# Patient Record
Sex: FEMALE | Race: WHITE | NOT HISPANIC OR LATINO | Employment: UNEMPLOYED | URBAN - METROPOLITAN AREA
[De-identification: names, ages, dates, MRNs, and addresses within clinical notes are randomized per-mention and may not be internally consistent; named-entity substitution may affect disease eponyms.]

---

## 2022-03-18 LAB
EXTERNAL ABO GROUPING: NORMAL
EXTERNAL ANTIBODY SCREEN: NORMAL
EXTERNAL RH FACTOR: POSITIVE

## 2022-05-12 LAB
EXTERNAL HIV-1/2 AB-AG: NORMAL
GLUCOSE 1H P 50 G GLC PO SERPL-MCNC: 131 MG/DL (ref 70–183)

## 2022-05-18 LAB
EXTERNAL CHLAMYDIA SCREEN: NEGATIVE
EXTERNAL GONORRHEA SCREEN: NEGATIVE
EXTERNAL HEPATITIS B SURFACE ANTIGEN: NEGATIVE

## 2022-05-24 ENCOUNTER — TELEPHONE (OUTPATIENT)
Dept: PERINATAL CARE | Facility: CLINIC | Age: 22
End: 2022-05-24

## 2022-05-24 NOTE — TELEPHONE ENCOUNTER
Mariuszm with [facility] regarding receiving referral to schedule patient for her detailed ultrasound  Her rick is 11/23    20wks 7/6   I don't have a telephone number to call her  As soon as I receive a call back, I will call patient

## 2022-07-13 NOTE — PATIENT INSTRUCTIONS
Thank you for choosing us for your  care today  If you have any questions about your ultrasound or care, please do not hesitate to contact us or your primary obstetrician  Some general instructions for your pregnancy are:    Protect against coronavirus: get vaccinated - pregnant women are increased risk of severe COVID  Notify your primary care doctor if you have any symptoms  Exercise: Aim for 22 minutes per day (150 minutes per week) of regular exercise  Walking is great! Nutrition: aim for calcium-rich and iron-rich foods as well as healthy sources of protein  Learn about Preeclampsia: preeclampsia is a common, serious high blood pressure complication in pregnancy  A blood pressure of 572TYHG (systolic or top number) or 77CPXE (diastolic or bottom number) is not normal and needs evaluation by your doctor  Aspirin is sometimes prescribed in early pregnancy to prevent preeclampsia in women with risk factors - ask your obstetrician if you should be on this medication  If you smoke, try to reduce how many cigarettes you smoke or try to quit completely  Do not vape  Other warning signs to watch out for in pregnancy or postpartum: chest pain, obstructed breathing or shortness of breath, seizures, thoughts of hurting yourself or your baby, bleeding, a painful or swollen leg, fever, or headache (see [facility] POST-BIRTH Warning Signs campaign)  If these happen call 911  Itching is also not normal in pregnancy and if you experience this, especially over your hands and feet, potentially worse at night, notify your doctors  Low dose aspirin, when started early in pregnancy, can reduce your risk of (prevent) preeclampsia  General dose recommendation is 162mg (two of the 81mg tablets) daily  Side effects are generally none to mild, however, if you experience what you think may be an allergic reaction after starting aspirin, immediately stop it and notify your doctor    If you have asthma or acid reflux and notice an increase in symptom frequency or severity, consider stopping this medication and notify your doctor  If you have had bariatric surgery, you may need a different dose of medication with or without acid-reducing medication  We advise routine discontinuation of this medication at 36 weeks  This QR code below links to additional information from ACOG on preeclampsia  There is also more information at [employer] com cy (Preeclampsia [employer])

## 2022-07-14 ENCOUNTER — ROUTINE PRENATAL (OUTPATIENT)
Dept: PERINATAL CARE | Facility: OTHER | Age: 22
End: 2022-07-14
Payer: COMMERCIAL

## 2022-07-14 VITALS
WEIGHT: 266.8 LBS | BODY MASS INDEX: 45.55 KG/M2 | DIASTOLIC BLOOD PRESSURE: 83 MMHG | SYSTOLIC BLOOD PRESSURE: 133 MMHG | HEART RATE: 91 BPM | HEIGHT: 64 IN

## 2022-07-14 DIAGNOSIS — Z36.3 ENCOUNTER FOR ANTENATAL SCREENING FOR MALFORMATIONS: ICD-10-CM

## 2022-07-14 DIAGNOSIS — Z36.86 ENCOUNTER FOR ANTENATAL SCREENING FOR CERVICAL LENGTH: ICD-10-CM

## 2022-07-14 DIAGNOSIS — Z87.59 HISTORY OF GESTATIONAL HYPERTENSION: ICD-10-CM

## 2022-07-14 DIAGNOSIS — O99.210 OBESITY IN PREGNANCY, ANTEPARTUM: ICD-10-CM

## 2022-07-14 DIAGNOSIS — Z3A.21 21 WEEKS GESTATION OF PREGNANCY: ICD-10-CM

## 2022-07-14 DIAGNOSIS — O09.899 SUPERVISION OF OTHER HIGH RISK PREGNANCIES, UNSPECIFIED TRIMESTER: ICD-10-CM

## 2022-07-14 DIAGNOSIS — O34.219 HISTORY OF CESAREAN DELIVERY, ANTEPARTUM: Primary | ICD-10-CM

## 2022-07-14 PROCEDURE — 76817 TRANSVAGINAL US OBSTETRIC: CPT | Performed by: OBSTETRICS & GYNECOLOGY

## 2022-07-14 PROCEDURE — 99242 OFF/OP CONSLTJ NEW/EST SF 20: CPT | Performed by: OBSTETRICS & GYNECOLOGY

## 2022-07-14 PROCEDURE — 76811 OB US DETAILED SNGL FETUS: CPT | Performed by: OBSTETRICS & GYNECOLOGY

## 2022-07-14 RX ORDER — ASPIRIN 81 MG/1
TABLET ORAL
COMMUNITY

## 2022-07-14 NOTE — PROGRESS NOTES
[address]: Ms Melina Enriquez was seen today for anatomic survey and cervical length screening ultrasound  See ultrasound report under "OB Procedures" tab  Review of Systems   Constitutional: Negative for chills, fever and unexpected weight change  HENT: Negative for congestion, dental problem, facial swelling and sore throat  Eyes: Negative for visual disturbance  Respiratory: Negative for cough and shortness of breath  Cardiovascular: Negative for chest pain and palpitations  Gastrointestinal: Negative for diarrhea and vomiting  Endocrine: Negative for polydipsia  Genitourinary: Negative for dysuria and vaginal bleeding  Musculoskeletal: Negative for back pain and joint swelling  Skin: Negative for rash and wound  Allergic/Immunologic: Negative for immunocompromised state  Neurological: Negative for seizures and headaches  Hematological: Does not bruise/bleed easily  Psychiatric/Behavioral: Negative for hallucinations and suicidal ideas  Physical Exam  Constitutional:       General: She is not in acute distress  Appearance: Normal appearance  She is not ill-appearing, toxic-appearing or diaphoretic  HENT:      Head: Normocephalic and atraumatic  Nose: No congestion or rhinorrhea  Eyes:      General: No scleral icterus  Right eye: No discharge  Left eye: No discharge  Extraocular Movements: Extraocular movements intact  Conjunctiva/sclera: Conjunctivae normal    Pulmonary:      Effort: Pulmonary effort is normal  No respiratory distress  Musculoskeletal:      Cervical back: Normal range of motion  Skin:     Coloration: Skin is not jaundiced or pale  Findings: No erythema, lesion or rash  Neurological:      General: No focal deficit present  Mental Status: She is alert and oriented to person, place, and time     Psychiatric:         Mood and Affect: Mood normal          Behavior: Behavior normal  Please don't hesitate to contact our office with any concerns or questions    Jada Rai MD

## 2022-07-14 NOTE — PROGRESS NOTES
Ultrasound Probe Disinfection    A transvaginal ultrasound was performed  Prior to use, disinfection was performed with High Level Disinfection Process (Trophon)  Probe serial number [id number]: [id number] was used        Irwin [place]  07/14/22  8:11 AM

## 2022-07-14 NOTE — LETTER
2022    [address]  [address]    Patient: Austyn Ca   YOB: 2000   Date of Visit: 2022   Gestational age 18w1d   Coco Dysonrid of this communication: Routine       Dear Damon Sanchez,    This patient was seen recently in our  office  Consultation is contained in body of ultrasound report which has been faxed to you under separate cover; please contact us if you do not receive this  Please don't hesitate to contact our office with any concerns or questions       Sincerely,      Nelida Quinn MD  Attending Physician, [place]

## 2022-08-12 LAB — EXTERNAL SYPHILIS RPR SCREEN: NORMAL

## 2022-08-29 ENCOUNTER — ULTRASOUND (OUTPATIENT)
Dept: PERINATAL CARE | Facility: OTHER | Age: 22
End: 2022-08-29
Payer: COMMERCIAL

## 2022-08-29 VITALS
SYSTOLIC BLOOD PRESSURE: 121 MMHG | HEIGHT: 64 IN | DIASTOLIC BLOOD PRESSURE: 79 MMHG | WEIGHT: 270.6 LBS | BODY MASS INDEX: 46.2 KG/M2 | HEART RATE: 93 BPM

## 2022-08-29 DIAGNOSIS — O99.210 OBESITY AFFECTING PREGNANCY, ANTEPARTUM: Primary | ICD-10-CM

## 2022-08-29 DIAGNOSIS — Z3A.27 27 WEEKS GESTATION OF PREGNANCY: ICD-10-CM

## 2022-08-29 PROBLEM — E66.01 OBESITY, MORBID, BMI 40.0-49.9 (HCC): Status: ACTIVE | Noted: 2020-07-10

## 2022-08-29 PROCEDURE — 76816 OB US FOLLOW-UP PER FETUS: CPT | Performed by: OBSTETRICS & GYNECOLOGY

## 2022-08-29 PROCEDURE — 99213 OFFICE O/P EST LOW 20 MIN: CPT | Performed by: OBSTETRICS & GYNECOLOGY

## 2022-08-29 NOTE — PROGRESS NOTES
The patient was seen today for an ultrasound  Please see ultrasound report (located under Ob Procedures) for additional details  Thank you very much for allowing us to participate in the care of this very nice patient  Should you have any questions, please do not hesitate to contact me  Shreyas Jeffers MD [address] [facility]  Attending Physician, [place]

## 2022-10-12 ENCOUNTER — ULTRASOUND (OUTPATIENT)
Dept: PERINATAL CARE | Facility: OTHER | Age: 22
End: 2022-10-12
Payer: COMMERCIAL

## 2022-10-12 VITALS
HEART RATE: 107 BPM | BODY MASS INDEX: 47.63 KG/M2 | SYSTOLIC BLOOD PRESSURE: 132 MMHG | DIASTOLIC BLOOD PRESSURE: 82 MMHG | HEIGHT: 64 IN | WEIGHT: 279 LBS

## 2022-10-12 DIAGNOSIS — O36.63X0 EXCESSIVE FETAL GROWTH AFFECTING MANAGEMENT OF PREGNANCY IN THIRD TRIMESTER, SINGLE OR UNSPECIFIED FETUS: ICD-10-CM

## 2022-10-12 DIAGNOSIS — Z3A.34 34 WEEKS GESTATION OF PREGNANCY: ICD-10-CM

## 2022-10-12 DIAGNOSIS — O99.210 OBESITY AFFECTING PREGNANCY, ANTEPARTUM: Primary | ICD-10-CM

## 2022-10-12 PROBLEM — O36.60X0 EXCESSIVE FETAL GROWTH AFFECTING MANAGEMENT OF MOTHER: Status: ACTIVE | Noted: 2022-10-12

## 2022-10-12 PROCEDURE — 76816 OB US FOLLOW-UP PER FETUS: CPT | Performed by: OBSTETRICS & GYNECOLOGY

## 2022-10-12 PROCEDURE — 76819 FETAL BIOPHYS PROFIL W/O NST: CPT | Performed by: OBSTETRICS & GYNECOLOGY

## 2022-10-12 PROCEDURE — 99213 OFFICE O/P EST LOW 20 MIN: CPT | Performed by: OBSTETRICS & GYNECOLOGY

## 2022-10-12 NOTE — PROGRESS NOTES
The patient was seen today for an ultrasound  Please see ultrasound report (located under Ob Procedures) for additional details  Thank you very much for allowing us to participate in the care of this very nice patient  Should you have any questions, please do not hesitate to contact me  Shreyas Myers MD [address]  Attending Physician, [place]

## 2022-11-01 ENCOUNTER — HOSPITAL ENCOUNTER (OUTPATIENT)
Facility: HOSPITAL | Age: 22
Discharge: HOME | End: 2022-11-01
Attending: OBSTETRICS & GYNECOLOGY | Admitting: OBSTETRICS & GYNECOLOGY

## 2022-11-01 VITALS
HEIGHT: 64 IN | SYSTOLIC BLOOD PRESSURE: 115 MMHG | TEMPERATURE: 98.3 F | OXYGEN SATURATION: 98 % | HEART RATE: 70 BPM | WEIGHT: 285 LBS | DIASTOLIC BLOOD PRESSURE: 59 MMHG | BODY MASS INDEX: 48.65 KG/M2 | RESPIRATION RATE: 18 BRPM

## 2022-11-01 LAB
ALBUMIN SERPL BCP-MCNC: 2.6 G/DL (ref 3.5–5)
ALP SERPL-CCNC: 112 U/L (ref 46–116)
ALT SERPL W P-5'-P-CCNC: 17 U/L (ref 12–78)
ANION GAP SERPL CALCULATED.3IONS-SCNC: 9 MMOL/L (ref 4–13)
AST SERPL W P-5'-P-CCNC: 16 U/L (ref 5–45)
BILIRUB SERPL-MCNC: 0.97 MG/DL (ref 0.2–1)
BUN SERPL-MCNC: 4 MG/DL (ref 5–25)
CALCIUM ALBUM COR SERPL-MCNC: 10 MG/DL (ref 8.3–10.1)
CALCIUM SERPL-MCNC: 8.9 MG/DL (ref 8.3–10.1)
CHLORIDE SERPL-SCNC: 105 MMOL/L (ref 96–108)
CO2 SERPL-SCNC: 25 MMOL/L (ref 21–32)
CREAT SERPL-MCNC: 0.51 MG/DL (ref 0.6–1.3)
CREAT UR-MCNC: 25.1 MG/DL
ERYTHROCYTE [DISTWIDTH] IN BLOOD BY AUTOMATED COUNT: 14 % (ref 11.6–15.1)
GFR SERPL CREATININE-BSD FRML MDRD: 135 ML/MIN/1.73SQ M
GLUCOSE SERPL-MCNC: 80 MG/DL (ref 65–140)
HCT VFR BLD AUTO: 34.7 % (ref 34.8–46.1)
HGB BLD-MCNC: 11.4 G/DL (ref 11.5–15.4)
MCH RBC QN AUTO: 27.3 PG (ref 26.8–34.3)
MCHC RBC AUTO-ENTMCNC: 32.9 G/DL (ref 31.4–37.4)
MCV RBC AUTO: 83 FL (ref 82–98)
PLATELET # BLD AUTO: 214 THOUSANDS/UL (ref 149–390)
PMV BLD AUTO: 10.7 FL (ref 8.9–12.7)
POTASSIUM SERPL-SCNC: 3.7 MMOL/L (ref 3.5–5.3)
PROT SERPL-MCNC: 7.3 G/DL (ref 6.4–8.4)
PROT UR-MCNC: <6 MG/DL
PROT/CREAT UR: <0.24 MG/G{CREAT} (ref 0–0.1)
RBC # BLD AUTO: 4.18 MILLION/UL (ref 3.81–5.12)
SODIUM SERPL-SCNC: 139 MMOL/L (ref 135–147)
WBC # BLD AUTO: 8.55 THOUSAND/UL (ref 4.31–10.16)

## 2022-11-01 NOTE — PROGRESS NOTES
L&D Triage Note - OB/GYN  Rebecca Rivera 23 y o  female MRN: [id number]  Unit/Bed#: L&D 327-01 Encounter: [id number]      ASSESSMENT:    Rebecca Rivera is a 23 y o   at 36w6d  who presents today due to concern for preeclampsia  Due to her lack of elevated pressures more than 4 hours apart or lab abnormalities, at this point she does not qualify for the diagnosis of gestational hypertension or preeclampsia  It is therefore safe to discharge her home at this time  PLAN:    1) Blood pressure monitoring for  2) Collect PreE Labs  • CMC  • CMP  • UA  • P:C ratio  3) Collect GBS swab   Continue routine prenatal care  4) Discharge from [facility] triage with  labor precautions    - Reviewed rupture of membranes, false vs true labor, decreased fetal movement, and vaginal bleeding   - Pt to call provider with any concerns and follow up at her next scheduled prenatal appointment    - Case discussed with Dr Zepeda    SUBJECTIVE:    Michelle Kirkland 23 y o   at 36w6d with an Estimated Date of Delivery: 22 who was sent over today from the office following a blood pressure of 150/90  The patient is tearful and explains that “this happened last time”  Her prior pregnancy ended in a primary  section in 2020 following a failed induction for gestational hypertension  This pregnancy has also been complicated by polyhydramnios (JANICE 31) and excessive fetal growth  She denies contractions, leakage of fluid, vaginal bleeding  She says that the she feels good fetal movement       OBJECTIVE:    Vitals:    22 1200   BP: 115/59   Pulse:    Resp: 18   Temp:    SpO2:      Labs:   Platelets: 146  AST/ALT: 16/17  P:C: <0 24   ROS:  Constitutional: Negative  Neuro:  Denies headache, vision changes  Respiratory: Negative, denies shortness of breath  Cardiovascular:  Denies retrosternal pain  Gastrointestinal:  Denies epigastric pain, denies upper abdominal pain,    General Physical Exam:  General: in no apparent distress, non-toxic and alert  Cardiovascular: Cor RRR  Lungs: non-labored breathing  Abdomen: abdomen is soft without significant tenderness, masses, organomegaly or guarding  Lower extremeties: nontender    Cervical Exam  SVE: patient declined    Fetal monitoring:  FHT:  135 bpm/ Moderate 6 - 25 bpm / 15 x 15 accelerations present, no decelerations  [place]: contractions not noted, uterine irritability present            SAMEERA Calix PGY-1  11/1/2022 12:35 PM

## 2022-11-01 NOTE — DISCHARGE INSTRUCTIONS
Pregnancy at [address] to [address]:   What changes are happening with my body? You are considered full term at the beginning of 37 weeks  Your breathing may be easier if your baby has moved down into a head-down position  You may need to urinate more often because the baby may be pressing on your bladder  You may also feel more discomfort and get tired easily  How do I care for myself at this stage of my pregnancy? Eat a variety of healthy foods  Healthy foods include fruits, vegetables, whole-grain breads, low-fat dairy foods, beans, lean meats, and fish  Drink liquids as directed  Ask how much liquid to drink each day and which liquids are best for you  Limit caffeine to less than 200 milligrams each day  Limit your intake of fish to 2 servings each week  Choose fish low in mercury such as canned light tuna, shrimp, salmon, cod, or tilapia  Do not  eat fish high in mercury such as swordfish, tilefish, amado mackerel, and shark  Take prenatal vitamins as directed  Your need for certain vitamins and minerals, such as folic acid, increases during pregnancy  Prenatal vitamins provide some of the extra vitamins and minerals you need  Prenatal vitamins may also help to decrease the risk of certain birth defects  Rest as needed  Put your feet up if you have swelling in your ankles and feet  Talk to your healthcare provider about exercise  Moderate exercise can help you stay fit  Your healthcare provider will help you plan an exercise program that is safe for you during pregnancy  Do not smoke  Smoking increases your risk of a miscarriage and other health problems during your pregnancy  Smoking can cause your baby to be born early or weigh less at birth  Ask your healthcare provider for information if you need help quitting  Do not drink alcohol  Alcohol passes from your body to your baby through the placenta   It can affect your baby's brain development and cause fetal alcohol syndrome (FAS)  FAS is a group of conditions that causes mental, behavior, and growth problems  Talk to your healthcare provider before you take any medicines  Many medicines may harm your baby if you take them when you are pregnant  Do not take any medicines, vitamins, herbs, or supplements without first talking to your healthcare provider  Never use illegal or street drugs (such as marijuana or cocaine) while you are pregnant  What are some safety tips during pregnancy? Avoid hot tubs and saunas  Do not use a hot tub or sauna while you are pregnant, especially during your first trimester  Hot tubs and saunas may raise your baby's temperature and increase the risk of birth defects  Avoid toxoplasmosis  This is an infection caused by eating raw meat or being around infected cat feces  It can cause birth defects, miscarriages, and other problems  Wash your hands after you touch raw meat  Make sure any meat is well-cooked before you eat it  Avoid raw eggs and unpasteurized milk  Use gloves or ask someone else to clean your cat's litter box while you are pregnant  Ask your healthcare provider about travel  The most comfortable time to travel is during the second trimester  Ask your provider if you can travel after 36 weeks  You may not be able to travel in an airplane after 36 weeks  He or she may also recommend you avoid long road trips  What changes are happening with my baby? By 38 weeks, your baby may weigh between 6 and 9 pounds  Your baby may be about 14 inches long from the top of the head to the rump (baby's bottom)  Your baby hears well enough to know your voice  As your baby gets larger, you may feel fewer kicks and more stretching and rolling  Your baby may move into a head-down position  Your baby will also rest lower in your abdomen  What do I need to know about prenatal care? Your healthcare provider will check your blood pressure and weight   You may also need the following:  A urine test  may also be done to check for sugar and protein  These can be signs of gestational diabetes or infection  Protein in your urine may also be a sign of preeclampsia  Preeclampsia is a condition that can develop during week 20 or later of your pregnancy  It causes high blood pressure, and it can cause problems with your kidneys and other organs  A gestational diabetes screen  may be done  Your healthcare provider may order either a 1-step or 2-step oral glucose tolerance test (OGTT)  1-step OGTT:  Your blood sugar level will be tested after you have not eaten for 8 hours (fasting)  You will then be given a glucose drink  Your level will be tested again 1 hour and 2 hours after you finish the drink  2-step OGTT:  You do not have to fast for the first part of the test  You will have the glucose drink at any time of day  Your blood sugar level will be checked 1 hour later  If your blood sugar is higher than a certain level, another test will be ordered  You will fast and your blood sugar level will be tested  You will have the glucose drink  Your blood will be tested again 1 hour, 2 hours, and 3 hours after you finish the glucose drink  A blood test  may be done to check for anemia (low iron level)  A Tdap vaccine  may be recommended by your healthcare provider  A group B strep test  is a test that is done to check for group B strep infection  Group B strep is a type of bacteria that may be found in the vagina or rectum  It can be passed to your baby during delivery if you have it  Your healthcare provider will take swab your vagina or rectum and send the sample to the lab for tests  Fundal height  is a measurement of your uterus to check your baby's growth  This number is usually the same as the number of weeks that you have been pregnant  Your healthcare provider may also check your baby's position  Your baby's heart rate  will be checked      When should I seek immediate care? You develop a severe headache that does not go away  You have new or increased vision changes, such as blurred or spotted vision  You have new or increased swelling in your face or hands  You have vaginal spotting or bleeding  Your water broke or you feel warm water gushing or trickling from your vagina  When should I call my obstetrician? You have more than 5 contractions in 1 hour  You notice any changes in your baby's movements  You have abdominal cramps, pressure, or tightening  You have a change in vaginal discharge  You have chills or a fever  You have vaginal itching, burning, or pain  You have yellow, green, white, or foul-smelling vaginal discharge  You have pain or burning when you urinate, less urine than usual, or pink or bloody urine  You have questions or concerns about your condition or care  CARE AGREEMENT:   You have the right to help plan your care  Learn about your health condition and how it may be treated  Discuss treatment options with your healthcare providers to decide what care you want to receive  You always have the right to refuse treatment  The above information is an  only  It is not intended as medical advice for individual conditions or treatments  Talk to your doctor, nurse or pharmacist before following any medical regimen to see if it is safe and effective for you  © Copyright [facility] 2022 Information is for End User's use only and may not be sold, redistributed or otherwise used for commercial purposes   All illustrations and images included in CareNotes® are the copyrighted property of A D A M , Inc  or [address] [place] [address]

## 2022-11-03 LAB — GP B STREP DNA SPEC QL NAA+PROBE: NEGATIVE

## 2022-11-18 ENCOUNTER — HOSPITAL ENCOUNTER (INPATIENT)
Facility: HOSPITAL | Age: 22
LOS: 4 days | Discharge: HOME | End: 2022-11-22
Attending: OBSTETRICS & GYNECOLOGY | Admitting: OBSTETRICS & GYNECOLOGY

## 2022-11-18 ENCOUNTER — HOSPITAL ENCOUNTER (OUTPATIENT)
Dept: LABOR AND DELIVERY | Facility: HOSPITAL | Age: 22
Discharge: HOME | End: 2022-11-18

## 2022-11-18 DIAGNOSIS — Z98.891 S/P REPEAT LOW TRANSVERSE C-SECTION: ICD-10-CM

## 2022-11-18 DIAGNOSIS — Z3A.39 39 WEEKS GESTATION OF PREGNANCY: Primary | ICD-10-CM

## 2022-11-18 PROBLEM — O13.9 GESTATIONAL HYPERTENSION: Status: ACTIVE | Noted: 2022-11-18

## 2022-11-18 LAB
ABO GROUP BLD: NORMAL
ALBUMIN SERPL BCP-MCNC: 3.1 G/DL (ref 3.5–5)
ALP SERPL-CCNC: 162 U/L (ref 46–116)
ALT SERPL W P-5'-P-CCNC: 17 U/L (ref 12–78)
ANION GAP SERPL CALCULATED.3IONS-SCNC: 12 MMOL/L (ref 4–13)
AST SERPL W P-5'-P-CCNC: 18 U/L (ref 5–45)
BASOPHILS # BLD AUTO: 0.03 THOUSANDS/ÂΜL (ref 0–0.1)
BASOPHILS NFR BLD AUTO: 0 % (ref 0–1)
BILIRUB SERPL-MCNC: 1.2 MG/DL (ref 0.2–1)
BLD GP AB SCN SERPL QL: NEGATIVE
BUN SERPL-MCNC: 7 MG/DL (ref 5–25)
CALCIUM ALBUM COR SERPL-MCNC: 9.7 MG/DL (ref 8.3–10.1)
CALCIUM SERPL-MCNC: 9 MG/DL (ref 8.3–10.1)
CHLORIDE SERPL-SCNC: 103 MMOL/L (ref 96–108)
CO2 SERPL-SCNC: 22 MMOL/L (ref 21–32)
CREAT SERPL-MCNC: 0.58 MG/DL (ref 0.6–1.3)
CREAT UR-MCNC: 38.8 MG/DL
EOSINOPHIL # BLD AUTO: 0.14 THOUSAND/ÂΜL (ref 0–0.61)
EOSINOPHIL NFR BLD AUTO: 1 % (ref 0–6)
ERYTHROCYTE [DISTWIDTH] IN BLOOD BY AUTOMATED COUNT: 14 % (ref 11.6–15.1)
GFR SERPL CREATININE-BSD FRML MDRD: 129 ML/MIN/1.73SQ M
GLUCOSE SERPL-MCNC: 79 MG/DL (ref 65–140)
HCT VFR BLD AUTO: 37.7 % (ref 34.8–46.1)
HGB BLD-MCNC: 12.8 G/DL (ref 11.5–15.4)
IMM GRANULOCYTES # BLD AUTO: 0.07 THOUSAND/UL (ref 0–0.2)
IMM GRANULOCYTES NFR BLD AUTO: 1 % (ref 0–2)
LYMPHOCYTES # BLD AUTO: 2.39 THOUSANDS/ÂΜL (ref 0.6–4.47)
LYMPHOCYTES NFR BLD AUTO: 19 % (ref 14–44)
MCH RBC QN AUTO: 27.7 PG (ref 26.8–34.3)
MCHC RBC AUTO-ENTMCNC: 34 G/DL (ref 31.4–37.4)
MCV RBC AUTO: 82 FL (ref 82–98)
MONOCYTES # BLD AUTO: 0.79 THOUSAND/ÂΜL (ref 0.17–1.22)
MONOCYTES NFR BLD AUTO: 6 % (ref 4–12)
NEUTROPHILS # BLD AUTO: 9.1 THOUSANDS/ÂΜL (ref 1.85–7.62)
NEUTS SEG NFR BLD AUTO: 73 % (ref 43–75)
NRBC BLD AUTO-RTO: 0 /100 WBCS
PLATELET # BLD AUTO: 216 THOUSANDS/UL (ref 149–390)
PMV BLD AUTO: 10.8 FL (ref 8.9–12.7)
POTASSIUM SERPL-SCNC: 3.5 MMOL/L (ref 3.5–5.3)
PROT SERPL-MCNC: 7.8 G/DL (ref 6.4–8.4)
PROT UR-MCNC: 9 MG/DL
PROT/CREAT UR: 0.23 MG/G{CREAT} (ref 0–0.1)
RBC # BLD AUTO: 4.62 MILLION/UL (ref 3.81–5.12)
RH BLD: POSITIVE
SODIUM SERPL-SCNC: 137 MMOL/L (ref 135–147)
SPECIMEN EXPIRATION DATE: NORMAL
WBC # BLD AUTO: 12.52 THOUSAND/UL (ref 4.31–10.16)

## 2022-11-18 PROCEDURE — 4A1HXCZ MONITORING OF PRODUCTS OF CONCEPTION, CARDIAC RATE, EXTERNAL APPROACH: ICD-10-PCS | Performed by: OBSTETRICS & GYNECOLOGY

## 2022-11-18 RX ORDER — SODIUM CHLORIDE, SODIUM LACTATE, POTASSIUM CHLORIDE, CALCIUM CHLORIDE 600; 310; 30; 20 MG/100ML; MG/100ML; MG/100ML; MG/100ML
125 INJECTION, SOLUTION INTRAVENOUS CONTINUOUS
Status: DISCONTINUED | OUTPATIENT
Start: 2022-11-18 | End: 2022-11-20

## 2022-11-18 RX ORDER — ONDANSETRON 2 MG/ML
4 INJECTION INTRAMUSCULAR; INTRAVENOUS EVERY 6 HOURS PRN
Status: DISCONTINUED | OUTPATIENT
Start: 2022-11-18 | End: 2022-11-20

## 2022-11-19 ENCOUNTER — ANESTHESIA EVENT (INPATIENT)
Dept: LABOR AND DELIVERY | Facility: HOSPITAL | Age: 22
End: 2022-11-19

## 2022-11-19 ENCOUNTER — ANESTHESIA (INPATIENT)
Dept: LABOR AND DELIVERY | Facility: HOSPITAL | Age: 22
End: 2022-11-19

## 2022-11-19 PROCEDURE — 10H073Z INSERTION OF MONITORING ELECTRODE INTO PRODUCTS OF CONCEPTION, VIA NATURAL OR ARTIFICIAL OPENING: ICD-10-PCS | Performed by: OBSTETRICS & GYNECOLOGY

## 2022-11-19 PROCEDURE — 3E033VJ INTRODUCTION OF OTHER HORMONE INTO PERIPHERAL VEIN, PERCUTANEOUS APPROACH: ICD-10-PCS | Performed by: OBSTETRICS & GYNECOLOGY

## 2022-11-19 RX ORDER — OXYTOCIN/RINGER'S LACTATE 30/500 ML
1-30 PLASTIC BAG, INJECTION (ML) INTRAVENOUS
Status: DISCONTINUED | OUTPATIENT
Start: 2022-11-19 | End: 2022-11-20

## 2022-11-19 RX ORDER — LIDOCAINE HYDROCHLORIDE AND EPINEPHRINE 15; 5 MG/ML; UG/ML
INJECTION, SOLUTION EPIDURAL AS NEEDED
Status: DISCONTINUED | OUTPATIENT
Start: 2022-11-19 | End: 2022-11-20

## 2022-11-19 RX ORDER — ROPIVACAINE HYDROCHLORIDE 5 MG/ML
INJECTION, SOLUTION EPIDURAL; INFILTRATION; PERINEURAL AS NEEDED
Status: DISCONTINUED | OUTPATIENT
Start: 2022-11-19 | End: 2022-11-20

## 2022-11-19 RX ORDER — CALCIUM CARBONATE 200(500)MG
1000 TABLET,CHEWABLE ORAL 3 TIMES DAILY PRN
Status: DISCONTINUED | OUTPATIENT
Start: 2022-11-19 | End: 2022-11-20

## 2022-11-19 RX ORDER — ROPIVACAINE HYDROCHLORIDE 2 MG/ML
INJECTION, SOLUTION EPIDURAL; INFILTRATION; PERINEURAL CONTINUOUS PRN
Status: DISCONTINUED | OUTPATIENT
Start: 2022-11-19 | End: 2022-11-20

## 2022-11-19 RX ADMIN — SODIUM CHLORIDE, POTASSIUM CHLORIDE, SODIUM LACTATE AND CALCIUM CHLORIDE 125 ML/HR: 600; 310; 30; 20 INJECTION, SOLUTION INTRAVENOUS at 23:10

## 2022-11-19 RX ADMIN — ROPIVACAINE HYDROCHLORIDE 5 MG: 5 INJECTION EPIDURAL; INFILTRATION; PERINEURAL at 19:39

## 2022-11-19 RX ADMIN — ONDANSETRON 4 MG: 2 INJECTION INTRAMUSCULAR; INTRAVENOUS at 22:08

## 2022-11-19 RX ADMIN — Medication 2 MILLI-UNITS/MIN: at 04:30

## 2022-11-19 RX ADMIN — ROPIVACAINE HYDROCHLORIDE: 2 INJECTION, SOLUTION EPIDURAL; INFILTRATION at 20:00

## 2022-11-19 RX ADMIN — SODIUM CHLORIDE, POTASSIUM CHLORIDE, SODIUM LACTATE AND CALCIUM CHLORIDE 999 ML/HR: 600; 310; 30; 20 INJECTION, SOLUTION INTRAVENOUS at 18:56

## 2022-11-19 RX ADMIN — ROPIVACAINE HYDROCHLORIDE 5 ML/HR: 2 INJECTION EPIDURAL; INFILTRATION; PERINEURAL at 19:40

## 2022-11-19 RX ADMIN — LIDOCAINE HYDROCHLORIDE AND EPINEPHRINE 3 ML: 15; 5 INJECTION, SOLUTION EPIDURAL at 19:37

## 2022-11-19 RX ADMIN — SODIUM CHLORIDE, POTASSIUM CHLORIDE, SODIUM LACTATE AND CALCIUM CHLORIDE 125 ML/HR: 600; 310; 30; 20 INJECTION, SOLUTION INTRAVENOUS at 04:30

## 2022-11-19 RX ADMIN — SODIUM CHLORIDE, POTASSIUM CHLORIDE, SODIUM LACTATE AND CALCIUM CHLORIDE 125 ML/HR: 600; 310; 30; 20 INJECTION, SOLUTION INTRAVENOUS at 12:31

## 2022-11-19 NOTE — OB LABOR/OXYTOCIN SAFETY PROGRESS
Oxytocin Safety Progress Check Note - Michelle Kirkland 23 y o  female MRN: [id number]    Unit/Bed#: L&D 325-01 Encounter: [id number]    Dose (jeronimo-units/min) Oxytocin: 16 jeronimo-units/min  Contraction Frequency (minutes): 1-3  Contraction Quality: Mild  Tachysystole: No   Cervical Dilation: 5        Cervical Effacement: 50  Fetal Station: Ballotable  Baseline Rate: 145 bpm  Fetal Heart Rate: 142 BPM  FHR Category: Category I           Vital Signs:   Vitals:    11/19/22 1042   BP: 123/60   Pulse: 80   Resp:    Temp:        Notes/comments:   Pt feeling ctx but not uncomfortable  Making cervical change now  Continue pitocin augmentation          Kylha Hickman DO 11/19/2022 11:03 AM

## 2022-11-19 NOTE — OB LABOR/OXYTOCIN SAFETY PROGRESS
Oxytocin Safety Progress Check Note - Michelle Kirkland 23 y o  female MRN: [id number]    Unit/Bed#: L&D 325-01 Encounter: [id number]    Dose (jeronimo-units/min) Oxytocin: 28 jeronimo-units/min  Contraction Frequency (minutes): 2-5  Contraction Quality: Mild  Tachysystole: No   Cervical Dilation: 6        Cervical Effacement: 70  Fetal Station: -3  Baseline Rate: 135 bpm  Fetal Heart Rate: 142 BPM  FHR Category: Category I               Vital Signs:   Vitals:    11/19/22 1636   BP: 135/72   Pulse: 103   Resp:    Temp:        Notes/comments: AROM for clear copious fluid  FSE placed  Pt tolerated procedure well  Cat 1 FHTs  Continue pitocin augmentation          Kylah Hickman DO 11/19/2022 5:36 PM

## 2022-11-19 NOTE — OB LABOR/OXYTOCIN SAFETY PROGRESS
Oxytocin Safety Progress Check Note - Michelle Kirkland 23 y o  female MRN: [id number]    Unit/Bed#: L&D 325-01 Encounter: [id number]    Dose (jeronimo-units/min) Oxytocin: 28 jeronimo-units/min  Contraction Frequency (minutes): 2-5  Contraction Quality: Mild  Tachysystole: No   Cervical Dilation: 5        Cervical Effacement: 60  Fetal Station: Ballotable  Baseline Rate: 135 bpm  Fetal Heart Rate: 142 BPM  FHR Category: Category I               Vital Signs:   Vitals:    11/19/22 1636   BP: 135/72   Pulse: 103   Resp:    Temp:        Notes/comments: Late entry from 1515  No change since last check  Recommend AROM- pt agreeable but wishes to wait until her mother arrives          Kylah Hickman DO 11/19/2022 5:35 PM

## 2022-11-19 NOTE — OB LABOR/OXYTOCIN SAFETY PROGRESS
Oxytocin Safety Progress Check Note - Michelle Kirkland 23 y o  female MRN: [id number]    Unit/Bed#: L&D 325-01 Encounter: [id number]    Dose (jeronimo-units/min) Oxytocin: 6 jeronimo-units/min  Contraction Frequency (minutes): irregular  Contraction Quality: Mild  Tachysystole: No   Cervical Dilation: 3-4        Cervical Effacement: 50  Fetal Station: -3  Baseline Rate: 130 bpm  Fetal Heart Rate: 130 BPM  FHR Category: Category I               Vital Signs:   Vitals:    11/19/22 0626   BP: 119/62   Pulse: 81   Resp:    Temp:        Notes/comments:   Pt comfortable  Aiken balloon fell out and cervical exam is as above  FHT Cat 1  Continue to titrate Pitocin          Spring Edwards MD 11/19/2022 6:51 AM

## 2022-11-19 NOTE — OB LABOR/OXYTOCIN SAFETY PROGRESS
Labor Progress Note - Michelle Kirkland 23 y o  female MRN: [id number]    Unit/Bed#: L&D 325-01 Encounter: [id number]       Contraction Frequency (minutes): irregular, irritable  Contraction Quality: Mild  Tachysystole: No   Cervical Dilation: 1        Cervical Effacement: 30  Fetal Station: Ballotable  Baseline Rate: 125 bpm  Fetal Heart Rate: 130 BPM  FHR Category: Category I               Vital Signs:   Vitals:    11/19/22 0159   BP: 132/69   Pulse: 77   Resp:    Temp:        Notes/comments: FB still in  Category I tracing   Irregular contraction pattern, Will plan to start pitocin        Isaac Godinez MD 11/19/2022 4:14 AM

## 2022-11-19 NOTE — H&P
H & P- Obstetrics   Michelle Kirkland 23 y o  female MRN: [id number]  Unit/Bed#: L&D 325-01 Encounter: [id number]    Assessment: 23 y o   at 39w2d admitted for induction of labor for gestational hypertension  SVE:   FHT: Category I  GBS status:  Negative     Plan:   · Admit  · CBC, RPR, Type & Screen  · CMP, protein creatinine ratio  · Analgesia at maternal request  · Aiken balloon for cervical ripening with plan for Pitocin titration  · Antibiotics not indicated at this time  ·  cc/hour  · Clear liquid diet    Dr Serena Renee aware      Chief Complaint: I am here for my induction    HPI: Shaun Yeung is a 23 y o   with an ARUN of 2022, by Other Basis at 39w2d who is being admitted for induction of labor for gestational hypertension  She denies having uterine contractions, has no LOF, and reports no VB   She states that the baby moves as usual     Patient Active Problem List   Diagnosis   • Obesity, morbid, BMI 40 0-49 9 ([address] )   • 39 weeks gestation of pregnancy   • Obesity affecting pregnancy, antepartum   • Excessive fetal growth affecting management of mother   • Gestational hypertension   • History of  delivery       Baby complications/comments: suspected macrosomia     Ultrasound 10/12/22    Fetus # 1 of 1  Vertex presentation  Probable macrosomia  Placenta Location = Posterior, fundal  No placenta previa  Placenta Grade = II     MEASUREMENTS (* Included In Average GA)     AC              33 2 cm        37 weeks 1 day * (>97%)  BPD              9 1 cm        36 weeks 6 days* (>97%)  HC              33 6 cm        38 weeks 4 days* (>95%)  Femur            7 3 cm        37 weeks 1 day * (>95%)     Cerebellum       4 4 cm        34 weeks 1 day     HC/AC           1 01 [0 96 - 1 11]                 (35%)  FL/AC             22 [20 - 24]  FL/BPD            80 [71 - 87]  EFW [place] 4   3150 grams - 6 lbs 15 oz                 (>97%)    Review of Systems Constitutional: Negative for chills and fever  Eyes: Negative for visual disturbance  Respiratory: Negative for shortness of breath  Cardiovascular: Negative for chest pain  Gastrointestinal: Negative for abdominal pain, constipation, diarrhea, nausea and vomiting  Genitourinary: Negative for dysuria, hematuria, vaginal bleeding, vaginal discharge and vaginal pain  Neurological: Negative for headaches  OB History    Para Term  AB Living   2 1 1     1   SAB IAB Ectopic Multiple Live Births           1      # Outcome Date GA Lbr Moises/2nd Weight Sex Delivery Anes PTL Lv   2 Current            1 Term 20 38w0d  3375 g (7 lb 7 1 oz) M CS-LTranv EPI  ASHLEY       Past Medical History:   Diagnosis Date   • Gestational hypertension     38 weeks, 2020       Past Surgical History:   Procedure Laterality Date   •  SECTION         Social History     Tobacco Use   • Smoking status: Former     Types: Cigarettes   • Smokeless tobacco: Never   Substance Use Topics   • Alcohol use: Not Currently     Comment: before pregnancy       Allergies   Allergen Reactions   • Alice (Diagnostic) - Food Allergy Other (See Comments)     Itchy throat   • Penicillins Rash     Was young       Medications Prior to Admission   Medication   • Prenatal MV-Min-Fe Fum-FA-DHA (PRENATAL 1 PO)       Objective: Body mass index is 48 06 kg/m²  Vitals:    22 2121   BP: 145/78   Pulse: 85   Resp: 18   Temp: 98 9 °F (37 2 °C)       Physical Exam:  Physical Exam  Constitutional:       General: She is not in acute distress  Appearance: She is well-developed and well-nourished  She is obese  She is not ill-appearing, toxic-appearing or diaphoretic  Genitourinary:      Vulva normal       No vaginal discharge  HENT:      Head: Normocephalic and atraumatic  Eyes:      General: No scleral icterus  Conjunctiva/sclera: Conjunctivae normal    Cardiovascular:      Rate and Rhythm: Normal rate  Pulmonary:      Effort: Pulmonary effort is normal    Abdominal:      Palpations: Abdomen is soft  Tenderness: There is no abdominal tenderness  Neurological:      General: No focal deficit present  Mental Status: She is alert and oriented to person, place, and time  Skin:     General: Skin is warm and dry  Psychiatric:         Mood and Affect: Mood and affect and mood normal          Behavior: Behavior normal    Vitals reviewed  Exam conducted with a chaperone present            SVE: 1/30/-4    FHT:  Baseline: 130 bpm  Variability: Moderate 6-25 bpm  Accelerations: Present 15x15  Decelerations: None    TOCO: irritability with some irregular contractions       Lab Results   Component Value Date    WBC 12 52 (H) 11/18/2022    HGB 12 8 11/18/2022    HCT 37 7 11/18/2022     11/18/2022     Lab Results   Component Value Date    K 3 5 11/18/2022     11/18/2022    CO2 22 11/18/2022    BUN 7 11/18/2022    CREATININE 0 58 (L) 11/18/2022    AST 18 11/18/2022    ALT 17 11/18/2022     Prenatal Labs: Reviewed      Blood type: A Positive  Antibody: Negative  GBS: Negative  HIV: Negative  Rubella: Immune  VDRL/RPR: Non reactive  Varicella: Immune  HBsAg: Negative  Chlamydia: Negative  Gonorrhea: Negative  Diabetes 1 hour screen: 131    >2 Midnights  INPATIENT     Signature/Title: Ganesh Whitten MD  Date: 11/18/2022  Time: 8:36 PM

## 2022-11-19 NOTE — OB LABOR/OXYTOCIN SAFETY PROGRESS
Oxytocin Safety Progress Check Note - Michelle Kirkland 23 y o  female MRN: [id number]    Unit/Bed#: L&D 325-01 Encounter: [id number]    Dose (jeronimo-units/min) Oxytocin: 20 jeronimo-units/min  Contraction Frequency (minutes): 1-4  Contraction Quality: Mild  Tachysystole: No   Cervical Dilation: 5        Cervical Effacement: 50  Fetal Station: Ballotable  Baseline Rate: 145 bpm  Fetal Heart Rate: 142 BPM  FHR Category: Category I               Vital Signs:   Vitals:    11/19/22 1235   BP: 148/72   Pulse: 91   Resp:    Temp:        Notes/comments:   Patient is comfortable  Fetal heart tracing has had an occasional variable overall moderate variability with accelerations  Cervical exam is as above and unchanged from last check  Will continue to monitor  Patient will like to hold off on artificial rupture membranes  Discussed with Dr Lily Northern Candance Risser, MD 11/19/2022 12:52 PM

## 2022-11-20 PROBLEM — Z98.891 S/P REPEAT LOW TRANSVERSE C-SECTION: Status: ACTIVE | Noted: 2022-08-29

## 2022-11-20 LAB
BASE EXCESS BLDCOA CALC-SCNC: -2.8 MMOL/L (ref 3–11)
BASE EXCESS BLDCOV CALC-SCNC: -4.7 MMOL/L (ref 1–9)
HCO3 BLDCOA-SCNC: 24.4 MMOL/L (ref 17.3–27.3)
HCO3 BLDCOV-SCNC: 19.4 MMOL/L (ref 12.2–28.6)
O2 CT VFR BLDCOA CALC: 4.8 ML/DL
OXYHGB MFR BLDCOA: 21.8 %
OXYHGB MFR BLDCOV: 70.3 %
PCO2 BLDCOA: 51.5 MM[HG] (ref 30–60)
PCO2 BLDCOV: 33.7 MM HG (ref 27–43)
PH BLDCOA: 7.29 [PH] (ref 7.23–7.43)
PH BLDCOV: 7.38 [PH] (ref 7.19–7.49)
PO2 BLDCOA: 13.6 MM HG (ref 5–25)
PO2 BLDCOV: 30.7 MM HG (ref 15–45)
RPR SER QL: NORMAL
SAO2 % BLDCOV: 15.3 ML/DL

## 2022-11-20 PROCEDURE — 10H07YZ INSERTION OF OTHER DEVICE INTO PRODUCTS OF CONCEPTION, VIA NATURAL OR ARTIFICIAL OPENING: ICD-10-PCS | Performed by: OBSTETRICS & GYNECOLOGY

## 2022-11-20 RX ORDER — PROMETHAZINE HYDROCHLORIDE 25 MG/ML
12.5 INJECTION, SOLUTION INTRAMUSCULAR; INTRAVENOUS ONCE AS NEEDED
Status: CANCELLED | OUTPATIENT
Start: 2022-11-20

## 2022-11-20 RX ORDER — CHLOROPROCAINE HYDROCHLORIDE 30 MG/ML
INJECTION, SOLUTION EPIDURAL; INFILTRATION; INTRACAUDAL; PERINEURAL
Status: COMPLETED
Start: 2022-11-20 | End: 2022-11-20

## 2022-11-20 RX ORDER — LIDOCAINE HYDROCHLORIDE AND EPINEPHRINE 20; 5 MG/ML; UG/ML
INJECTION, SOLUTION EPIDURAL; INFILTRATION; INTRACAUDAL; PERINEURAL
Status: COMPLETED
Start: 2022-11-20 | End: 2022-11-20

## 2022-11-20 RX ORDER — CEFOXITIN SODIUM 2 G/50ML
2000 INJECTION, SOLUTION INTRAVENOUS EVERY 6 HOURS
Status: DISCONTINUED | OUTPATIENT
Start: 2022-11-20 | End: 2022-11-20 | Stop reason: ALTCHOICE

## 2022-11-20 RX ORDER — KETOROLAC TROMETHAMINE 30 MG/ML
30 INJECTION, SOLUTION INTRAMUSCULAR; INTRAVENOUS EVERY 6 HOURS PRN
Status: DISPENSED | OUTPATIENT
Start: 2022-11-20 | End: 2022-11-21

## 2022-11-20 RX ORDER — HYDROMORPHONE HCL/PF 1 MG/ML
0.5 SYRINGE (ML) INJECTION
Status: CANCELLED | OUTPATIENT
Start: 2022-11-20

## 2022-11-20 RX ORDER — ONDANSETRON 2 MG/ML
4 INJECTION INTRAMUSCULAR; INTRAVENOUS ONCE AS NEEDED
Status: DISCONTINUED | OUTPATIENT
Start: 2022-11-20 | End: 2022-11-20

## 2022-11-20 RX ORDER — CLINDAMYCIN PHOSPHATE 900 MG/50ML
900 INJECTION INTRAVENOUS EVERY 8 HOURS
Status: DISCONTINUED | OUTPATIENT
Start: 2022-11-20 | End: 2022-11-20

## 2022-11-20 RX ORDER — MORPHINE SULFATE 0.5 MG/ML
INJECTION, SOLUTION EPIDURAL; INTRATHECAL; INTRAVENOUS
Status: DISPENSED
Start: 2022-11-20 | End: 2022-11-21

## 2022-11-20 RX ORDER — ONDANSETRON 2 MG/ML
4 INJECTION INTRAMUSCULAR; INTRAVENOUS ONCE AS NEEDED
Status: CANCELLED | OUTPATIENT
Start: 2022-11-20

## 2022-11-20 RX ORDER — ACETAMINOPHEN 325 MG/1
975 TABLET ORAL EVERY 6 HOURS PRN
Status: DISCONTINUED | OUTPATIENT
Start: 2022-11-20 | End: 2022-11-20

## 2022-11-20 RX ORDER — SODIUM CHLORIDE, SODIUM LACTATE, POTASSIUM CHLORIDE, CALCIUM CHLORIDE 600; 310; 30; 20 MG/100ML; MG/100ML; MG/100ML; MG/100ML
125 INJECTION, SOLUTION INTRAVENOUS CONTINUOUS
Status: DISCONTINUED | OUTPATIENT
Start: 2022-11-20 | End: 2022-11-21

## 2022-11-20 RX ORDER — CLINDAMYCIN PHOSPHATE 900 MG/50ML
900 INJECTION INTRAVENOUS ONCE
Status: COMPLETED | OUTPATIENT
Start: 2022-11-21 | End: 2022-11-21

## 2022-11-20 RX ORDER — PHENYLEPHRINE HCL IN 0.9% NACL 1 MG/10 ML
SYRINGE (ML) INTRAVENOUS
Status: DISPENSED
Start: 2022-11-20 | End: 2022-11-21

## 2022-11-20 RX ORDER — KETOROLAC TROMETHAMINE 30 MG/ML
INJECTION, SOLUTION INTRAMUSCULAR; INTRAVENOUS
Status: COMPLETED
Start: 2022-11-20 | End: 2022-11-20

## 2022-11-20 RX ORDER — FENTANYL CITRATE/PF 50 MCG/ML
50 SYRINGE (ML) INJECTION
Status: DISCONTINUED | OUTPATIENT
Start: 2022-11-20 | End: 2022-11-22 | Stop reason: HOSPADM

## 2022-11-20 RX ORDER — WATER 1000 ML/1000ML
INJECTION, SOLUTION INTRAVENOUS
Status: DISPENSED
Start: 2022-11-20 | End: 2022-11-21

## 2022-11-20 RX ORDER — DIPHENHYDRAMINE HYDROCHLORIDE 50 MG/ML
25 INJECTION INTRAMUSCULAR; INTRAVENOUS EVERY 6 HOURS PRN
Status: ACTIVE | OUTPATIENT
Start: 2022-11-20 | End: 2022-11-21

## 2022-11-20 RX ORDER — DOCUSATE SODIUM 100 MG/1
100 CAPSULE, LIQUID FILLED ORAL 2 TIMES DAILY
Status: DISCONTINUED | OUTPATIENT
Start: 2022-11-20 | End: 2022-11-22 | Stop reason: HOSPADM

## 2022-11-20 RX ORDER — LIDOCAINE HYDROCHLORIDE AND EPINEPHRINE 20; 5 MG/ML; UG/ML
INJECTION, SOLUTION EPIDURAL; INFILTRATION; INTRACAUDAL; PERINEURAL AS NEEDED
Status: DISCONTINUED | OUTPATIENT
Start: 2022-11-20 | End: 2022-11-20

## 2022-11-20 RX ORDER — ONDANSETRON 2 MG/ML
INJECTION INTRAMUSCULAR; INTRAVENOUS
Status: COMPLETED
Start: 2022-11-20 | End: 2022-11-20

## 2022-11-20 RX ORDER — CLINDAMYCIN PHOSPHATE 900 MG/50ML
900 INJECTION INTRAVENOUS ONCE
Status: DISCONTINUED | OUTPATIENT
Start: 2022-11-20 | End: 2022-11-20 | Stop reason: SDUPTHER

## 2022-11-20 RX ORDER — IBUPROFEN 600 MG/1
600 TABLET ORAL EVERY 6 HOURS SCHEDULED
Status: DISCONTINUED | OUTPATIENT
Start: 2022-11-21 | End: 2022-11-22 | Stop reason: HOSPADM

## 2022-11-20 RX ORDER — ONDANSETRON 2 MG/ML
4 INJECTION INTRAMUSCULAR; INTRAVENOUS EVERY 6 HOURS PRN
Status: DISPENSED | OUTPATIENT
Start: 2022-11-20 | End: 2022-11-21

## 2022-11-20 RX ORDER — OXYTOCIN/RINGER'S LACTATE 30/500 ML
PLASTIC BAG, INJECTION (ML) INTRAVENOUS CONTINUOUS PRN
Status: DISCONTINUED | OUTPATIENT
Start: 2022-11-20 | End: 2022-11-20

## 2022-11-20 RX ORDER — NALBUPHINE HCL 10 MG/ML
3 AMPUL (ML) INJECTION
Status: ACTIVE | OUTPATIENT
Start: 2022-11-20 | End: 2022-11-21

## 2022-11-20 RX ORDER — FENTANYL CITRATE/PF 50 MCG/ML
25 SYRINGE (ML) INJECTION
Status: CANCELLED | OUTPATIENT
Start: 2022-11-20

## 2022-11-20 RX ORDER — OXYCODONE HYDROCHLORIDE AND ACETAMINOPHEN 5; 325 MG/1; MG/1
1 TABLET ORAL EVERY 4 HOURS PRN
Status: ACTIVE | OUTPATIENT
Start: 2022-11-20 | End: 2022-11-21

## 2022-11-20 RX ORDER — ACETAMINOPHEN 325 MG/1
650 TABLET ORAL EVERY 6 HOURS SCHEDULED
Status: DISCONTINUED | OUTPATIENT
Start: 2022-11-20 | End: 2022-11-22 | Stop reason: HOSPADM

## 2022-11-20 RX ORDER — HYDROMORPHONE HCL/PF 1 MG/ML
0.5 SYRINGE (ML) INJECTION EVERY 2 HOUR PRN
Status: ACTIVE | OUTPATIENT
Start: 2022-11-20 | End: 2022-11-21

## 2022-11-20 RX ORDER — MORPHINE SULFATE 10 MG/ML
INJECTION, SOLUTION INTRAMUSCULAR; INTRAVENOUS AS NEEDED
Status: DISCONTINUED | OUTPATIENT
Start: 2022-11-20 | End: 2022-11-20

## 2022-11-20 RX ORDER — KETOROLAC TROMETHAMINE 30 MG/ML
INJECTION, SOLUTION INTRAMUSCULAR; INTRAVENOUS AS NEEDED
Status: DISCONTINUED | OUTPATIENT
Start: 2022-11-20 | End: 2022-11-20

## 2022-11-20 RX ORDER — ONDANSETRON 2 MG/ML
4 INJECTION INTRAMUSCULAR; INTRAVENOUS EVERY 8 HOURS PRN
Status: DISCONTINUED | OUTPATIENT
Start: 2022-11-21 | End: 2022-11-22 | Stop reason: HOSPADM

## 2022-11-20 RX ORDER — OXYCODONE HYDROCHLORIDE 5 MG/1
10 TABLET ORAL EVERY 4 HOURS PRN
Status: DISCONTINUED | OUTPATIENT
Start: 2022-11-21 | End: 2022-11-22 | Stop reason: HOSPADM

## 2022-11-20 RX ORDER — CALCIUM CARBONATE 200(500)MG
1000 TABLET,CHEWABLE ORAL DAILY PRN
Status: DISCONTINUED | OUTPATIENT
Start: 2022-11-20 | End: 2022-11-22 | Stop reason: HOSPADM

## 2022-11-20 RX ORDER — CHLOROPROCAINE HYDROCHLORIDE 30 MG/ML
INJECTION, SOLUTION EPIDURAL; INFILTRATION; INTRACAUDAL; PERINEURAL AS NEEDED
Status: DISCONTINUED | OUTPATIENT
Start: 2022-11-20 | End: 2022-11-20

## 2022-11-20 RX ORDER — SENNOSIDES 8.8 MG/5ML
8.8 LIQUID ORAL
Status: DISCONTINUED | OUTPATIENT
Start: 2022-11-20 | End: 2022-11-22 | Stop reason: HOSPADM

## 2022-11-20 RX ORDER — NALOXONE HYDROCHLORIDE 0.4 MG/ML
0.1 INJECTION, SOLUTION INTRAMUSCULAR; INTRAVENOUS; SUBCUTANEOUS
Status: ACTIVE | OUTPATIENT
Start: 2022-11-20 | End: 2022-11-21

## 2022-11-20 RX ORDER — OXYTOCIN/RINGER'S LACTATE 30/500 ML
PLASTIC BAG, INJECTION (ML) INTRAVENOUS
Status: DISPENSED
Start: 2022-11-20 | End: 2022-11-21

## 2022-11-20 RX ORDER — DIAPER,BRIEF,INFANT-TODD,DISP
1 EACH MISCELLANEOUS DAILY PRN
Status: DISCONTINUED | OUTPATIENT
Start: 2022-11-20 | End: 2022-11-22 | Stop reason: HOSPADM

## 2022-11-20 RX ORDER — ONDANSETRON 2 MG/ML
INJECTION INTRAMUSCULAR; INTRAVENOUS AS NEEDED
Status: DISCONTINUED | OUTPATIENT
Start: 2022-11-20 | End: 2022-11-20

## 2022-11-20 RX ORDER — EPHEDRINE SULFATE 50 MG/ML
INJECTION INTRAVENOUS
Status: DISPENSED
Start: 2022-11-20 | End: 2022-11-21

## 2022-11-20 RX ORDER — ALBUTEROL SULFATE 2.5 MG/3ML
2.5 SOLUTION RESPIRATORY (INHALATION) ONCE AS NEEDED
Status: CANCELLED | OUTPATIENT
Start: 2022-11-20

## 2022-11-20 RX ORDER — CLINDAMYCIN PHOSPHATE 900 MG/50ML
900 INJECTION INTRAVENOUS ONCE
Status: COMPLETED | OUTPATIENT
Start: 2022-11-20 | End: 2022-11-20

## 2022-11-20 RX ORDER — SIMETHICONE 80 MG
80 TABLET,CHEWABLE ORAL 4 TIMES DAILY PRN
Status: DISCONTINUED | OUTPATIENT
Start: 2022-11-20 | End: 2022-11-22 | Stop reason: HOSPADM

## 2022-11-20 RX ORDER — OXYCODONE HYDROCHLORIDE 5 MG/1
5 TABLET ORAL EVERY 4 HOURS PRN
Status: DISCONTINUED | OUTPATIENT
Start: 2022-11-21 | End: 2022-11-22 | Stop reason: HOSPADM

## 2022-11-20 RX ORDER — DIPHENHYDRAMINE HYDROCHLORIDE 50 MG/ML
25 INJECTION INTRAMUSCULAR; INTRAVENOUS EVERY 6 HOURS PRN
Status: DISCONTINUED | OUTPATIENT
Start: 2022-11-20 | End: 2022-11-22 | Stop reason: HOSPADM

## 2022-11-20 RX ORDER — ENOXAPARIN SODIUM 100 MG/ML
40 INJECTION SUBCUTANEOUS EVERY 12 HOURS
Status: DISCONTINUED | OUTPATIENT
Start: 2022-11-21 | End: 2022-11-22 | Stop reason: HOSPADM

## 2022-11-20 RX ORDER — OXYTOCIN/RINGER'S LACTATE 30/500 ML
62.5 PLASTIC BAG, INJECTION (ML) INTRAVENOUS ONCE
Status: DISCONTINUED | OUTPATIENT
Start: 2022-11-20 | End: 2022-11-22 | Stop reason: HOSPADM

## 2022-11-20 RX ORDER — OXYTOCIN/RINGER'S LACTATE 30/500 ML
PLASTIC BAG, INJECTION (ML) INTRAVENOUS
Status: COMPLETED
Start: 2022-11-20 | End: 2022-11-20

## 2022-11-20 RX ADMIN — ROPIVACAINE HYDROCHLORIDE: 2 INJECTION, SOLUTION EPIDURAL; INFILTRATION at 07:54

## 2022-11-20 RX ADMIN — KETOROLAC TROMETHAMINE 30 MG: 30 INJECTION, SOLUTION INTRAMUSCULAR; INTRAVENOUS at 23:41

## 2022-11-20 RX ADMIN — SODIUM CHLORIDE, POTASSIUM CHLORIDE, SODIUM LACTATE AND CALCIUM CHLORIDE 125 ML/HR: 600; 310; 30; 20 INJECTION, SOLUTION INTRAVENOUS at 07:07

## 2022-11-20 RX ADMIN — MORPHINE SULFATE 3 MG: 10 INJECTION INTRAVENOUS at 17:34

## 2022-11-20 RX ADMIN — LIDOCAINE HYDROCHLORIDE,EPINEPHRINE BITARTRATE 5 ML: 20; .005 INJECTION, SOLUTION EPIDURAL; INFILTRATION; INTRACAUDAL; PERINEURAL at 17:01

## 2022-11-20 RX ADMIN — KETOROLAC TROMETHAMINE 30 MG: 30 INJECTION, SOLUTION INTRAMUSCULAR at 23:41

## 2022-11-20 RX ADMIN — SODIUM CHLORIDE, POTASSIUM CHLORIDE, SODIUM LACTATE AND CALCIUM CHLORIDE: 600; 310; 30; 20 INJECTION, SOLUTION INTRAVENOUS at 17:39

## 2022-11-20 RX ADMIN — KETOROLAC TROMETHAMINE 30 MG: 30 INJECTION, SOLUTION INTRAMUSCULAR at 17:45

## 2022-11-20 RX ADMIN — LIDOCAINE HYDROCHLORIDE,EPINEPHRINE BITARTRATE 5 ML: 20; .005 INJECTION, SOLUTION EPIDURAL; INFILTRATION; INTRACAUDAL; PERINEURAL at 16:58

## 2022-11-20 RX ADMIN — SODIUM CHLORIDE, POTASSIUM CHLORIDE, SODIUM LACTATE AND CALCIUM CHLORIDE 125 ML/HR: 600; 310; 30; 20 INJECTION, SOLUTION INTRAVENOUS at 10:58

## 2022-11-20 RX ADMIN — LIDOCAINE HYDROCHLORIDE,EPINEPHRINE BITARTRATE 5 ML: 20; .005 INJECTION, SOLUTION EPIDURAL; INFILTRATION; INTRACAUDAL; PERINEURAL at 17:04

## 2022-11-20 RX ADMIN — ONDANSETRON 4 MG: 2 INJECTION INTRAMUSCULAR; INTRAVENOUS at 06:35

## 2022-11-20 RX ADMIN — ONDANSETRON 4 MG: 2 INJECTION INTRAMUSCULAR; INTRAVENOUS at 22:25

## 2022-11-20 RX ADMIN — Medication 2 MILLI-UNITS/MIN: at 01:00

## 2022-11-20 RX ADMIN — CHLOROPROCAINE HYDROCHLORIDE 5 MG: 30 INJECTION, SOLUTION EPIDURAL; INFILTRATION; INTRACAUDAL; PERINEURAL at 17:47

## 2022-11-20 RX ADMIN — ACETAMINOPHEN 975 MG: 325 TABLET ORAL at 12:46

## 2022-11-20 RX ADMIN — CLINDAMYCIN IN 5 PERCENT DEXTROSE 900 MG: 18 INJECTION, SOLUTION INTRAVENOUS at 15:46

## 2022-11-20 RX ADMIN — Medication 250 MILLI-UNITS/MIN: at 17:31

## 2022-11-20 RX ADMIN — SODIUM CHLORIDE, POTASSIUM CHLORIDE, SODIUM LACTATE AND CALCIUM CHLORIDE 125 ML/HR: 600; 310; 30; 20 INJECTION, SOLUTION INTRAVENOUS at 21:36

## 2022-11-20 RX ADMIN — CALCIUM CARBONATE (ANTACID) CHEW TAB 500 MG 1000 MG: 500 CHEW TAB at 00:15

## 2022-11-20 RX ADMIN — ONDANSETRON 4 MG: 2 INJECTION INTRAMUSCULAR; INTRAVENOUS at 17:40

## 2022-11-20 RX ADMIN — GENTAMICIN SULFATE 420 MG: 40 INJECTION, SOLUTION INTRAMUSCULAR; INTRAVENOUS at 16:24

## 2022-11-20 RX ADMIN — ROPIVACAINE HYDROCHLORIDE: 2 INJECTION, SOLUTION EPIDURAL; INFILTRATION at 16:01

## 2022-11-20 RX ADMIN — LIDOCAINE HYDROCHLORIDE,EPINEPHRINE BITARTRATE 5 ML: 20; .005 INJECTION, SOLUTION EPIDURAL; INFILTRATION; INTRACAUDAL; PERINEURAL at 16:54

## 2022-11-20 RX ADMIN — CLINDAMYCIN IN 5 PERCENT DEXTROSE 900 MG: 18 INJECTION, SOLUTION INTRAVENOUS at 17:17

## 2022-11-20 NOTE — OB LABOR/OXYTOCIN SAFETY PROGRESS
Oxytocin Safety Progress Check Note - Michelle Kirkland 23 y o  female MRN: [id number]    Unit/Bed#: L&D 325-01 Encounter: [id number]    Dose (jeronimo-units/min) Oxytocin: 30 jeronimo-units/min  Contraction Frequency (minutes): 2 5-4 5  Contraction Quality: Mild  Tachysystole: No   Cervical Dilation: Lip/rim (Comment)        Cervical Effacement: 100  Fetal Station: 0  Baseline Rate: 150 bpm  Fetal Heart Rate: 145 BPM  FHR Category: Category I               Vital Signs:   Vitals:    11/20/22 1055   BP: 160/90   Pulse: 93   Resp:    Temp:    SpO2:        Notes/comments: Patient feeling more pressure  Now 9 5/100/0 - posterior thin cervix that did not reduce with bearing down  Category I tracing  Continue pitocin  Patient in high fowlers   Will reassess in one hour        Shira Mccullough MD 11/20/2022 11:43 AM

## 2022-11-20 NOTE — ANESTHESIA PROCEDURE NOTES
Epidural Block    Patient location during procedure: OB  Start time: 11/19/2022 7:37 PM  Reason for block: primary anesthetic  Staffing  Performed: Anesthesiologist   Anesthesiologist: Prem Gross MD  Preanesthetic Checklist  Completed: patient identified, IV checked, site marked, risks and benefits discussed, surgical consent, monitors and equipment checked, pre-op evaluation and timeout performed  Epidural  Patient position: sitting  Prep: Betadine  Patient monitoring: frequent blood pressure checks, continuous pulse ox and heart rate  Approach: midline  Location: lumbar  Injection technique: DAVON saline  Needle  Needle type: Tuohy   Needle gauge: 18 G  Catheter type: side hole  Catheter size: 20 G  Catheter at skin depth: 15 cm  Catheter securement method: clear occlusive dressing  Test dose: negative  Assessment  Sensory level: T10  Number of attempts: 1no paresthesia on injection, negative aspiration for CSF and negative aspiration for heme  patient tolerated the procedure well with no immediate complications

## 2022-11-20 NOTE — ANESTHESIA PREPROCEDURE EVALUATION
Procedure:  LABOR ANALGESIA    Relevant Problems   CARDIO   (+) Gestational hypertension      GYN   (+) 39 weeks gestation of pregnancy      Other   (+) History of  delivery   (+) Obesity affecting pregnancy, antepartum   (+) Obesity, morbid, BMI 40 0-49 9 (formerly [facility])        Physical Exam    Airway    Mallampati score: II  TM Distance: <3 FB       Dental   No notable dental hx     Cardiovascular  Cardiovascular exam normal    Pulmonary  Pulmonary exam normal     Other Findings        Anesthesia Plan  ASA Score- 3     Anesthesia Type- epidural with ASA Monitors  Additional Monitors:   Airway Plan:           Plan Factors-Exercise tolerance (METS): >4 METS  Chart reviewed  Existing labs reviewed  Patient is not a current smoker  Induction-     Postoperative Plan-     Informed Consent- Anesthetic plan and risks discussed with patient

## 2022-11-20 NOTE — OB LABOR/OXYTOCIN SAFETY PROGRESS
Oxytocin Safety Progress Check Note - Michelle Kirkland 23 y o  female MRN: [id number]    Unit/Bed#: L&D 325-01 Encounter: [id number]    Dose (jeronimo-units/min) Oxytocin: 30 jeronimo-units/min  Contraction Frequency (minutes): 3-4  Contraction Quality: Mild  Tachysystole: No   Cervical Dilation: 10  Dilation Complete Date: 11/20/22  Dilation Complete Time: 1324  Cervical Effacement: 100  Fetal Station: 0  Baseline Rate: 165 bpm  Fetal Heart Rate: 145 BPM  FHR Category: Category I               Vital Signs:   Vitals:    11/20/22 1350   BP:    Pulse:    Resp:    Temp: (!) 100 9 °F (38 3 °C)   SpO2:        Notes/comments: Patient pushing with good effort  Maternal temperature of 100 9 - will repeat in 30 minutes  No fetal or maternal tachycardia at this time   Continue pushing        Jewel Bullock MD 11/20/2022 2:06 PM

## 2022-11-20 NOTE — OP NOTE
OPERATIVE REPORT  PATIENT NAME: Uriah Guillen    :  2000  MRN: [id number]  Pt Location: [facility] L&D OR ROOM 01    SURGERY DATE: 2022    Indications:   Arrest of descent  Failed TOLAC  Maternal request for repeat  section    Pre-operative Diagnosis:   39w4d pregnancy  History of prior  section  Gestational hypertension  Intraamniotic infection  BMI 48    Post-operative Diagnosis:   RLTCS    Attending: Bryant Rolle DO  Resident: Salima Rebolledo MD    Maternal Findings:  Uterus with omental adhesions  Normal tubes and ovaries bilaterally  No difficulty noted from skin to delivery     Findings:  Viable female weighing 8lbs 8 9oz;  Apgar scores of 8 at one minute and 9 at five minutes  Thick, meconium-stained amniotic fluid  Normal placenta with 3-vessel cord    Arterial and Venous Gases:  Umbilical Cord Venous Blood Gas:  Results from last 7 days   Lab Units 22  1723   PH COV  7 378   PCO2 COV mm HG 33 7   HCO3 COV mmol/L 19 4   BASE EXC COV mmol/L -4 7*   O2 CT CD VB mL/dL 15 3   O2 HGB, VENOUS CORD % 45 5     Umbilical Cord Arterial Blood Gas:  Results from last 7 days   Lab Units 22  1723   PH COA  7 293   PCO2 COA  51 5   PO2 COA mm HG 13 6   HCO3 COA mmol/L 24 4   BASE EXC COA mmol/L -2 8*   O2 CONTENT CORD ART ml/dl 4 8   O2 HGB, ARTERIAL CORD % 21 8       Specimens: Arterial and venous cord gases, cord blood, segment of umbilical cord, placenta to pathology    Quantitative Blood Loss: 340 mL    Fluids: 950cc LR    Drains: Aiken catheter           Complications:  None apparent; patient tolerated the procedure well  Disposition: PACU            Condition: stable    Procedure Details   The patient was seen prior to the procedure  Risks, benefits, possible complications, alternate treatment options, and expected outcomes were discussed with the patient    The patient agreed with the proposed plan and gave informed consent for a repeat low transverse  section  The patient was taken to the [facility] Operating Room at [id number] where she received a rebolus of her epidural  For infection prophylaxis, she received 900 mg clindamycin IV preoperatively in the setting of intra amniotic infection  Patient had received previously received gentamicin 5 mg/kg IV within 1 hour of incision time according to antibiotic guidelines  Fetal heart tones in the OR were assessed and noted to be within normal limits and a Aiken catheter and SCDs were placed  The abdomen was prepped with Chloraprep, the vagina was prepped with Betadine  Using sterile gloved hands, fetal pillow was inserted according to product instructions and and inflated with 180 cc normal saline solution  Following appropriate drying time, the patient was draped in the usual sterile manner  A Time Out was held and the above information confirmed  The patient was identified as Michelle Kirkland and the procedure verified as a  Delivery for arrest of descent  A Pfannenstiel incision was made and carried down through the underlying subcutaneous tissue to the fascia using a scalpel  The rectus fascia was then nicked in the midline and dissected laterally using Jon scissors  The rectus muscles were  and the peritoneum was identified, entered, and extended longitudinally with blunt dissection  Omental adhesions to the peritoneum and anterior uterus were carefully taken down using Bovie electrocautery  The [place] was inserted  The vesicouterine peritoneum was identified, entered and bluntly dissected laterally to form a bladder flap  A low transverse uterine incision was made with the scalpel and extended cephalocaudad with blunt dissection  The amnion was entered bluntly  The fetal head was palpated, elevated, and delivered through the uterine incision followed by the body without difficulty  Time of birth was noted at 36   There was noted to be spontaneous cry and good tone  There was no apparent injury to the   The umbilical cord was doubly clamped and cut after 30 seconds to allow for delayed cord clamping  The infant was handed off to the  providers  Arterial and venous cord gases, cord blood, and a segment of umbilical cord were obtained for evaluation  The placenta delivered spontaneously at 94 20 56 with uterine fundal massage and appeared normal  The uterus was repaired in situ and cleaned out with a moist lap sponge  The uterine incision was closed with a running locked suture of 0 Vicryl  A second layer of the same suture was used to imbricate the first   Serosal bleeding was cauterized using the Bovie  Hemostasis was noted to be excellent  The paracolic gutters were inspected and cleared of all clots and debris with moist lap sponges  The Van retractor was removed  The peritoneum was reapproximated with a horizontal mattress suture of 2-0 Plain gut  The fascia was closed with a running suture of 0 Vicryl  Subcutaneous adipose tissue was closed with a running suture of 2-0 Plain gut  The skin was closed with a subcuticular running suture of 4-0 Monocryl  A Mepilex dressing was applied  The patient appeared to tolerate the procedure very well  Lap sponge, needle, and instrument counts were correct x2  The patient's fundus was palpated and the uterus was expressed  She was then cleaned and transferred to her postpartum recovery room in stable condition and her infant went to the  nursery  Attending Attestation: Dr Hadley Bence, DO was present for the entire procedure        SIGNATURE: Jewel Bullock MD  DATE: 2022  TIME: 6:28 PM

## 2022-11-20 NOTE — OB LABOR/OXYTOCIN SAFETY PROGRESS
Oxytocin Safety Progress Check Note - Michelle Kirkland 23 y o  female MRN: [id number]    Unit/Bed#: L&D 325-01 Encounter: [id number]    Dose (jeronimo-units/min) Oxytocin: 0 jeronimo-units/min (per Dr Wali Quintana)  Contraction Frequency (minutes): 2-4  Contraction Quality: Mild  Tachysystole: No   Cervical Dilation: 6        Cervical Effacement: 70  Fetal Station: -2  Baseline Rate: 140 bpm  Fetal Heart Rate: 142 BPM  FHR Category: Category I               Vital Signs:   Vitals:    11/20/22 0000   BP: 118/58   Pulse: 88   Resp:    Temp:    SpO2:        Notes/comments:   Pt is comfortable  Fetal head is lower and more centered in the pelvis  Will plan for Pit break as pt has been on a Pit of 30 since 2030  Cat 1 tracing  Will restart at a Pit of 2  Discussed with attending          Wali Quintana MD 11/20/2022 12:22 AM

## 2022-11-20 NOTE — OB LABOR/OXYTOCIN SAFETY PROGRESS
Oxytocin Safety Progress Check Note - Michelle Kirkland 23 y o  female MRN: [id number]    Unit/Bed#: L&D 325-01 Encounter: [id number]    Dose (jeronimo-units/min) Oxytocin: 30 jeronimo-units/min  Contraction Frequency (minutes): 2-4  Contraction Quality: Mild  Tachysystole: No   Cervical Dilation: 7-8        Cervical Effacement: 100  Fetal Station: 0  Baseline Rate: 140 bpm  Fetal Heart Rate: 142 BPM  FHR Category: Category I           Vital Signs:  Vitals:    11/20/22 0741   BP: 131/64   Pulse: 79   Resp:    Temp:    SpO2:        Notes/comments:     Good cervical change since last check  ML  MVUs 150  Continut pitocin augmentation at 30 mu/min      Kylah Hickman DO 11/20/2022 7:51 AM

## 2022-11-20 NOTE — OB LABOR/OXYTOCIN SAFETY PROGRESS
Oxytocin Safety Progress Check Note - Michelle Kirkland 23 y o  female MRN: [id number]    Unit/Bed#: L&D 325-01 Encounter: [id number]    Dose (jeronimo-units/min) Oxytocin: 24 jeronimo-units/min  Contraction Frequency (minutes): 3-4  Contraction Quality: Mild  Tachysystole: No   Cervical Dilation: 6        Cervical Effacement: 80  Fetal Station: -2  Baseline Rate: 140 bpm  Fetal Heart Rate: 142 BPM  FHR Category: Category I               Vital Signs:   Vitals:    22 0358   BP:    Pulse:    Resp:    Temp: 98 8 °F (37 1 °C)   SpO2:        Notes/comments:   Patient is comfortable was in high Hoang's position when she was checked  Cervical exam with minimal change with change in cervical effacement  Pit at time of check was at 20  Discussed with patient that currently she did not have adequate MVU's and that the goal was to get her back to a Pit of 30 however if she continued to make no change she will be considered failed induction and would need a  section  Patient has been continuously repositioned every 15-20 minutes  Will plan to recheck again in 2 hours  Plan was discussed with attending          Destinee Hernandez MD 2022 4:52 AM

## 2022-11-20 NOTE — OB LABOR/OXYTOCIN SAFETY PROGRESS
Oxytocin Safety Progress Check Note - Michelle Kirkland 23 y o  female MRN: [id number]    Unit/Bed#: L&D 325-01 Encounter: [id number]    Dose (jeronimo-units/min) Oxytocin: 30 jeronimo-units/min  Contraction Frequency (minutes): 2 5-4  Contraction Quality: Mild  Tachysystole: No   Cervical Dilation: 10  Dilation Complete Date: 11/20/22  Dilation Complete Time: 1324  Cervical Effacement: 100  Fetal Station: 0  Baseline Rate: 160 bpm  Fetal Heart Rate: 145 BPM  FHR Category: Category I               Vital Signs:   Vitals:    11/20/22 1310   BP: 134/73   Pulse: 105   Resp:    Temp:    SpO2:        Notes/comments: posterior thin rim of cervix able to be reduced   Now pushing with good effort        Ganesh Whitten MD 11/20/2022 1:37 PM

## 2022-11-20 NOTE — OB LABOR/OXYTOCIN SAFETY PROGRESS
Oxytocin Safety Progress Check Note - Michelle Kirkland 23 y o  female MRN: [id number]    Unit/Bed#: L&D 325-01 Encounter: [id number]    Dose (jeronimo-units/min) Oxytocin: 0 jeronimo-units/min (place on hold per Dr Orestes James)  Contraction Frequency (minutes): 2-4  Contraction Quality: Mild  Tachysystole: No   Cervical Dilation: 10  Dilation Complete Date: 22  Dilation Complete Time: 1324  Cervical Effacement: 100  Fetal Station: 1  Baseline Rate: 150 bpm  Fetal Heart Rate: 145 BPM  FHR Category: Category I           Vital Signs:  Vitals:    22 1525   BP: 148/89   Pulse: (!) 116   Resp:    Temp:    SpO2:        Notes/comments:   Good effort with pushing  MVUs low at 120  IV site came out so pitocin was off for 20- 30 min  IUPC now showing minimal pressure changes  Will encourage rest for 30 min and then restart pitocin a 6mu/min and increase by 6s  Pt had temp of 100 9 which has since resolved and maternal HR 110s  [de-identified] FHT baseline 150s-160s  Discussed treatment for chorioamnionitis- will start clindamycin and gentamicin  Also discussed risks of long labor, chorioamnionitis, poor uterine contractility all of which are increasing her risks for postpartum hemorrhage  Pt remains hopeful for  and understands risks        Giuseppe Hickman  22  3:43 PM          Dawit Gracia DO 2022 3:37 PM

## 2022-11-20 NOTE — OB LABOR/OXYTOCIN SAFETY PROGRESS
Oxytocin Safety Progress Check Note - Michelle Kirkland 23 y o  female MRN: [id number]    Unit/Bed#: L&D 325-01 Encounter: [id number]    Dose (jeronimo-units/min) Oxytocin: 30 jeronimo-units/min  Contraction Frequency (minutes): 2-5  Contraction Quality: Mild  Tachysystole: No   Cervical Dilation: 9        Cervical Effacement: 100  Fetal Station: 0  Baseline Rate: 145 bpm  Fetal Heart Rate: 145 BPM  FHR Category: Category I               Vital Signs:   Vitals:    11/20/22 0957   BP: 134/69   Pulse: 98   Resp:    Temp:    SpO2:        Notes/comments: SVE as above  MVUs inadequate, but patient continuing to make change  Continue pitocin titration   Category I         Adeola Newman MD 11/20/2022 10:13 AM

## 2022-11-20 NOTE — OB LABOR/OXYTOCIN SAFETY PROGRESS
Oxytocin Safety Progress Check Note - Michelle Kirkland 23 y o  female MRN: [id number]    Unit/Bed#: L&D 325-01 Encounter: [id number]    Dose (jeronimo-units/min) Oxytocin: 30 jeronimo-units/min  Contraction Frequency (minutes): 2-4  Contraction Quality: Mild  Tachysystole: No   Cervical Dilation: 10  Dilation Complete Date: 11/20/22  Dilation Complete Time: 1324  Cervical Effacement: 100  Fetal Station: 0  Baseline Rate: 150 bpm  Fetal Heart Rate: 145 BPM  FHR Category: Category I               Vital Signs:   Vitals:    11/20/22 1431   BP: 153/86   Pulse: 105   Resp:    Temp:    SpO2:        Notes/comments: Repeat maternal temp 99 1, however, now fetal tachycardia in the 170s and maternal tachycardia 110 - will plan to start cefoxitin 2g q6h IV (instead of unasyn 2/2 rash to penicillins)   D/w Dr Maria Esther Buenrostro MD 11/20/2022 3:27 PM

## 2022-11-21 PROBLEM — D64.9 ANEMIA: Status: ACTIVE | Noted: 2022-11-21

## 2022-11-21 PROBLEM — O41.1290 CHORIOAMNIONITIS: Status: ACTIVE | Noted: 2022-11-21

## 2022-11-21 PROBLEM — R34 LOW URINE OUTPUT: Status: ACTIVE | Noted: 2022-11-21

## 2022-11-21 LAB
ANION GAP SERPL CALCULATED.3IONS-SCNC: 9 MMOL/L (ref 4–13)
BUN SERPL-MCNC: 13 MG/DL (ref 5–25)
CALCIUM SERPL-MCNC: 8.1 MG/DL (ref 8.3–10.1)
CHLORIDE SERPL-SCNC: 103 MMOL/L (ref 96–108)
CO2 SERPL-SCNC: 25 MMOL/L (ref 21–32)
CREAT SERPL-MCNC: 0.71 MG/DL (ref 0.6–1.3)
ERYTHROCYTE [DISTWIDTH] IN BLOOD BY AUTOMATED COUNT: 14 % (ref 11.6–15.1)
GFR SERPL CREATININE-BSD FRML MDRD: 119 ML/MIN/1.73SQ M
GLUCOSE SERPL-MCNC: 115 MG/DL (ref 65–140)
HCT VFR BLD AUTO: 28.6 % (ref 34.8–46.1)
HGB BLD-MCNC: 9.6 G/DL (ref 11.5–15.4)
MCH RBC QN AUTO: 28.1 PG (ref 26.8–34.3)
MCHC RBC AUTO-ENTMCNC: 33.6 G/DL (ref 31.4–37.4)
MCV RBC AUTO: 84 FL (ref 82–98)
PLATELET # BLD AUTO: 171 THOUSANDS/UL (ref 149–390)
PMV BLD AUTO: 11 FL (ref 8.9–12.7)
POTASSIUM SERPL-SCNC: 3.8 MMOL/L (ref 3.5–5.3)
RBC # BLD AUTO: 3.42 MILLION/UL (ref 3.81–5.12)
SODIUM SERPL-SCNC: 137 MMOL/L (ref 135–147)
WBC # BLD AUTO: 14.39 THOUSAND/UL (ref 4.31–10.16)

## 2022-11-21 RX ORDER — METOCLOPRAMIDE HYDROCHLORIDE 5 MG/ML
10 INJECTION INTRAMUSCULAR; INTRAVENOUS EVERY 6 HOURS PRN
Status: DISCONTINUED | OUTPATIENT
Start: 2022-11-21 | End: 2022-11-22 | Stop reason: HOSPADM

## 2022-11-21 RX ORDER — METOCLOPRAMIDE HYDROCHLORIDE 5 MG/ML
10 INJECTION INTRAMUSCULAR; INTRAVENOUS EVERY 6 HOURS PRN
Status: DISCONTINUED | OUTPATIENT
Start: 2022-11-21 | End: 2022-11-21

## 2022-11-21 RX ADMIN — SODIUM CHLORIDE, POTASSIUM CHLORIDE, SODIUM LACTATE AND CALCIUM CHLORIDE 125 ML/HR: 600; 310; 30; 20 INJECTION, SOLUTION INTRAVENOUS at 05:21

## 2022-11-21 RX ADMIN — IRON SUCROSE 200 MG: 20 INJECTION, SOLUTION INTRAVENOUS at 09:10

## 2022-11-21 RX ADMIN — CLINDAMYCIN IN 5 PERCENT DEXTROSE 900 MG: 18 INJECTION, SOLUTION INTRAVENOUS at 01:32

## 2022-11-21 RX ADMIN — ACETAMINOPHEN 650 MG: 325 TABLET, FILM COATED ORAL at 23:36

## 2022-11-21 RX ADMIN — DOCUSATE SODIUM 100 MG: 100 CAPSULE, LIQUID FILLED ORAL at 09:11

## 2022-11-21 RX ADMIN — ACETAMINOPHEN 650 MG: 325 TABLET, FILM COATED ORAL at 17:46

## 2022-11-21 RX ADMIN — IBUPROFEN 600 MG: 600 TABLET, FILM COATED ORAL at 17:46

## 2022-11-21 RX ADMIN — METOCLOPRAMIDE 10 MG: 5 INJECTION, SOLUTION INTRAMUSCULAR; INTRAVENOUS at 00:48

## 2022-11-21 RX ADMIN — GENTAMICIN SULFATE 420 MG: 40 INJECTION, SOLUTION INTRAMUSCULAR; INTRAVENOUS at 16:41

## 2022-11-21 RX ADMIN — IBUPROFEN 600 MG: 600 TABLET, FILM COATED ORAL at 23:36

## 2022-11-21 RX ADMIN — SODIUM CHLORIDE, POTASSIUM CHLORIDE, SODIUM LACTATE AND CALCIUM CHLORIDE 125 ML/HR: 600; 310; 30; 20 INJECTION, SOLUTION INTRAVENOUS at 11:56

## 2022-11-21 RX ADMIN — ACETAMINOPHEN 650 MG: 325 TABLET, FILM COATED ORAL at 11:51

## 2022-11-21 RX ADMIN — HYDROCORTISONE 1 APPLICATION: 1 CREAM TOPICAL at 20:42

## 2022-11-21 RX ADMIN — Medication 8.8 MG: at 21:21

## 2022-11-21 RX ADMIN — DOCUSATE SODIUM 100 MG: 100 CAPSULE, LIQUID FILLED ORAL at 17:46

## 2022-11-21 RX ADMIN — ENOXAPARIN SODIUM 40 MG: 40 INJECTION SUBCUTANEOUS at 20:42

## 2022-11-21 RX ADMIN — ENOXAPARIN SODIUM 40 MG: 40 INJECTION SUBCUTANEOUS at 09:11

## 2022-11-21 RX ADMIN — ACETAMINOPHEN 650 MG: 325 TABLET, FILM COATED ORAL at 06:14

## 2022-11-21 NOTE — ASSESSMENT & PLAN NOTE
, Hgb 12 8 --> post op Hgb 9 6, venofer given  Aiken removed, passed VT  Pain: Tylenol and toradol scheduled, nevaeh 5/10 PRN    FEN: Tolerating regular diet  DVT ppx: SCDs and Lovenox 40mg BID (BMI 48)  Denies passing flatus  Incision C/D/I, mepilex dressing in place

## 2022-11-21 NOTE — ASSESSMENT & PLAN NOTE
Systolic (37AVW), XPH:931 , Min:102 , DSY:393   Diastolic (46WTJ), RENE:21, Min:56, Max:80  PCr 0 23  Denies HA, changes in vision, SOB, RUQ pain

## 2022-11-21 NOTE — PLAN OF CARE
Problem: PAIN - ADULT  Goal: Verbalizes/displays adequate comfort level or baseline comfort level  Description: Interventions:  - Encourage patient to monitor pain and request assistance  - Assess pain using appropriate pain scale  - Administer analgesics based on type and severity of pain and evaluate response  - Implement non-pharmacological measures as appropriate and evaluate response  - Consider cultural and social influences on pain and pain management  - Notify physician/advanced practitioner if interventions unsuccessful or patient reports new pain  Outcome: Progressing     Problem: INFECTION - ADULT  Goal: Absence or prevention of progression during hospitalization  Description: INTERVENTIONS:  - Assess and monitor for signs and symptoms of infection  - Monitor lab/diagnostic results  - Monitor all insertion sites, i e  indwelling lines, tubes, and drains  - Monitor endotracheal if appropriate and nasal secretions for changes in amount and color  - [place] appropriate cooling/warming therapies per order  - Administer medications as ordered  - Instruct and encourage patient and family to use good hand hygiene technique  - Identify and instruct in appropriate isolation precautions for identified infection/condition  Outcome: Progressing  Goal: Absence of fever/infection during neutropenic period  Description: INTERVENTIONS:  - Monitor WBC    Outcome: Progressing     Problem: SAFETY ADULT  Goal: Patient will remain free of falls  Description: INTERVENTIONS:  - Educate patient/family on patient safety including physical limitations  - Instruct patient to call for assistance with activity   - Consult OT/PT to assist with strengthening/mobility   - Keep Call bell within reach  - Keep bed low and locked with side rails adjusted as appropriate  - Keep care items and personal belongings within reach  - Initiate and maintain comfort rounds  - Apply yellow socks and bracelet for high fall risk patients  - Consider moving patient to room near nurses station  Outcome: Progressing  Goal: Maintain or return to baseline ADL function  Description: INTERVENTIONS:  -  Assess patient's ability to carry out ADLs; assess patient's baseline for ADL function and identify physical deficits which impact ability to perform ADLs (bathing, care of mouth/teeth, toileting, grooming, dressing, etc )  - Assess/evaluate cause of self-care deficits   - Assess range of motion  - Assess patient's mobility; develop plan if impaired  - Assess patient's need for assistive devices and provide as appropriate  - Encourage maximum independence but intervene and supervise when necessary  - Involve family in performance of ADLs  - Assess for home care needs following discharge   - Consider OT consult to assist with ADL evaluation and planning for discharge  - Provide patient education as appropriate  Outcome: Progressing  Goal: Maintains/Returns to pre admission functional level  Description: INTERVENTIONS:  - Set and communicate daily mobility goal to care team and patient/family/caregiver  - Collaborate with rehabilitation services on mobility goals if consulted  - Out of bed for toileting  - Record patient progress and toleration of activity level   Outcome: Progressing     Problem: Knowledge Deficit  Goal: Patient/family/caregiver demonstrates understanding of disease process, treatment plan, medications, and discharge instructions  Description: Complete learning assessment and assess knowledge base    Interventions:  - Provide teaching at level of understanding  - Provide teaching via preferred learning methods  Outcome: Progressing     Problem: DISCHARGE PLANNING  Goal: Discharge to home or other facility with appropriate resources  Description: INTERVENTIONS:  - Identify barriers to discharge w/patient and caregiver  - Arrange for needed discharge resources and transportation as appropriate  - Identify discharge learning needs (meds, wound care, etc )  - Arrange for interpretive services to assist at discharge as needed  - Refer to Case Management Department for coordinating discharge planning if the patient needs post-hospital services based on physician/advanced practitioner order or complex needs related to functional status, cognitive ability, or social support system  Outcome: Progressing

## 2022-11-21 NOTE — PLAN OF CARE
Problem: PAIN - ADULT  Goal: Verbalizes/displays adequate comfort level or baseline comfort level  Description: Interventions:  - Encourage patient to monitor pain and request assistance  - Assess pain using appropriate pain scale  - Administer analgesics based on type and severity of pain and evaluate response  - Implement non-pharmacological measures as appropriate and evaluate response  - Consider cultural and social influences on pain and pain management  - Notify physician/advanced practitioner if interventions unsuccessful or patient reports new pain  Outcome: Progressing     Problem: INFECTION - ADULT  Goal: Absence or prevention of progression during hospitalization  Description: INTERVENTIONS:  - Assess and monitor for signs and symptoms of infection  - Monitor lab/diagnostic results  - Monitor all insertion sites, i e  indwelling lines, tubes, and drains  - Monitor endotracheal if appropriate and nasal secretions for changes in amount and color  - [place] appropriate cooling/warming therapies per order  - Administer medications as ordered  - Instruct and encourage patient and family to use good hand hygiene technique  - Identify and instruct in appropriate isolation precautions for identified infection/condition  Outcome: Progressing  Goal: Absence of fever/infection during neutropenic period  Description: INTERVENTIONS:  - Monitor WBC    Outcome: Progressing     Problem: SAFETY ADULT  Goal: Patient will remain free of falls  Description: INTERVENTIONS:  - Educate patient/family on patient safety including physical limitations  - Instruct patient to call for assistance with activity   - Consult OT/PT to assist with strengthening/mobility   - Keep Call bell within reach  - Keep bed low and locked with side rails adjusted as appropriate  - Keep care items and personal belongings within reach  - Initiate and maintain comfort rounds  - Make Fall Risk Sign visible to staff  - Offer Toileting every  Hours, in advance of need  - Initiate/Maintain alarm  - Obtain necessary fall risk management equipment:   - Apply yellow socks and bracelet for high fall risk patients  - Consider moving patient to room near nurses station  Outcome: Progressing  Goal: Maintain or return to baseline ADL function  Description: INTERVENTIONS:  -  Assess patient's ability to carry out ADLs; assess patient's baseline for ADL function and identify physical deficits which impact ability to perform ADLs (bathing, care of mouth/teeth, toileting, grooming, dressing, etc )  - Assess/evaluate cause of self-care deficits   - Assess range of motion  - Assess patient's mobility; develop plan if impaired  - Assess patient's need for assistive devices and provide as appropriate  - Encourage maximum independence but intervene and supervise when necessary  - Involve family in performance of ADLs  - Assess for home care needs following discharge   - Consider OT consult to assist with ADL evaluation and planning for discharge  - Provide patient education as appropriate  Outcome: Progressing  Goal: Maintains/Returns to pre admission functional level  Description: INTERVENTIONS:  - Perform BMAT or MOVE assessment daily    - Set and communicate daily mobility goal to care team and patient/family/caregiver  - Collaborate with rehabilitation services on mobility goals if consulted  - Perform Range of Motion  times a day  - Reposition patient every  hours    - Dangle patient  times a day  - Stand patient  times a day  - Ambulate patient  times a day  - Out of bed to chair  times a day   - Out of bed for meals  times a day  - Out of bed for toileting  - Record patient progress and toleration of activity level   Outcome: Progressing     Problem: Knowledge Deficit  Goal: Patient/family/caregiver demonstrates understanding of disease process, treatment plan, medications, and discharge instructions  Description: Complete learning assessment and assess knowledge base   Interventions:  - Provide teaching at level of understanding  - Provide teaching via preferred learning methods  Outcome: Progressing     Problem: DISCHARGE PLANNING  Goal: Discharge to home or other facility with appropriate resources  Description: INTERVENTIONS:  - Identify barriers to discharge w/patient and caregiver  - Arrange for needed discharge resources and transportation as appropriate  - Identify discharge learning needs (meds, wound care, etc )  - Arrange for interpretive services to assist at discharge as needed  - Refer to Case Management Department for coordinating discharge planning if the patient needs post-hospital services based on physician/advanced practitioner order or complex needs related to functional status, cognitive ability, or social support system  Outcome: Progressing

## 2022-11-21 NOTE — PROGRESS NOTES
Progress Note - OB/GYN  Keegan Carty 23 y o  female MRN: [id number]  Unit/Bed#: L&D 309-01 Encounter: [id number]    Assessment and Plan     Keegan Carty is a patient of: [facility]  She is POD# 1 s/p  repeat  section, low transverse incision  Recovering well and is stable       Chorioamnionitis  Assessment & Plan  Tmax 100 9 on  @ 13:50  Afebrile since  Clinda/Gent x1 dose postpartum      Low urine output  Assessment & Plan  175cc since midnight  Receiving 125ml/hr IV fluids  Monitor    Gestational hypertension  Assessment & Plan  Systolic (77RSZ), IVK:522 , Min:98 , VFD:948   Diastolic (62YTB), ISZ:57, Min:48, Max:77  PCr 0 23  Denies HA, changes in vision, SOB, RUQ pain      * S/P repeat low transverse   Assessment & Plan  , Hgb 12 8 --> post op Hgb 9 6, venofer ordered  Lines: marrufo in place   Pain: Tylenol and toradol scheduled, nevaeh 5/10 PRN    FEN: Tolerating regular diet  DVT ppx: SCDs and Lovenox 40mg BID (BMI 48)  Denies passing flatus  Incision C/D/I, mepilex dressing in place          Disposition    - Anticipate discharge home on PPD# 2-4      Subjective/Objective     Chief Complaint: Postpartum State     Subjective:    Keegan Carty is POD#1 s/p  repeat  section, low transverse incision  She has no current complaints  Pain is well controlled  Patient is not currently voiding  She is ambulating  Patient is not currently passing flatus and has had no bowel movement  She is tolerating PO, and denies nausea or vomitting  Patient denies fever, chills, chest pain, shortness of breath, or calf tenderness  Lochia is normal  She is  Breastfeeding  She is recovering well and is stable         Vitals:   /67 (BP Location: Right arm)   Pulse 97   Temp 98 1 °F (36 7 °C) (Oral)   Resp 18   Ht 5' 4" (1 626 m)   Wt 127 kg (280 lb)   SpO2 95%   Breastfeeding Yes   BMI 48 06 kg/m²       Intake/Output Summary (Last 24 hours) at 2022 [address] filed at 11/21/2022 6655  Gross per 24 hour   Intake 1500 ml   Output 2190 ml   Net -690 ml       Invasive Devices     Peripheral Intravenous Line  Duration           Peripheral IV 11/20/22 Left;Medial Wrist <1 day          Drain  Duration           Urethral Catheter Double-lumen 16 Fr  <1 day                Physical Exam:   GEN: Kesha Peterson appears well, alert and oriented x 3, pleasant and cooperative   CARDIO: RRR, no murmurs or rubs  RESP:  CTAB, no wheezes or rales  ABDOMEN: soft, no tenderness, no distention, fundus @ umbilicus, Incision C/D/I  EXTREMITIES: SCDs on, non tender, no erythema, b/l Neva's sign negative      Labs:     Hemoglobin   Date Value Ref Range Status   11/21/2022 9 6 (L) 11 5 - 15 4 g/dL Final   11/18/2022 12 8 11 5 - 15 4 g/dL Final     WBC   Date Value Ref Range Status   11/21/2022 14 39 (H) 4 31 - 10 16 Thousand/uL Final   11/18/2022 12 52 (H) 4 31 - 10 16 Thousand/uL Final     Platelets   Date Value Ref Range Status   11/21/2022 171 149 - 390 Thousands/uL Final   11/18/2022 216 149 - 390 Thousands/uL Final     Creatinine   Date Value Ref Range Status   11/18/2022 0 58 (L) 0 60 - 1 30 mg/dL Final     Comment:     Standardized to IDMS reference method   11/01/2022 0 51 (L) 0 60 - 1 30 mg/dL Final     Comment:     Standardized to IDMS reference method     AST   Date Value Ref Range Status   11/18/2022 18 5 - 45 U/L Final     Comment:     Specimen collection should occur prior to Sulfasalazine administration due to the potential for falsely depressed results  11/01/2022 16 5 - 45 U/L Final     Comment:     Specimen collection should occur prior to Sulfasalazine administration due to the potential for falsely depressed results  ALT   Date Value Ref Range Status   11/18/2022 17 12 - 78 U/L Final     Comment:     Specimen collection should occur prior to Sulfasalazine administration due to the potential for falsely depressed results      11/01/2022 17 12 - 78 U/L Final     Comment:     Specimen collection should occur prior to Sulfasalazine administration due to the potential for falsely depressed results  Nahid Rogers MD  11/21/2022  6:39 AM      The patient was seen and examined separate from previous exam by resident  I agree with the resident's findings and plan  The patient is feeling well overall  No complaints  Pain is improving with oral medication  Ambulating  No voiding difficulties  Tolerating a regular diet  Breastfeeding well  Lochia stable  Fundus firm  Incision clean and dry  Ambulate  Continue routine postpartum care      Carolann Hickman  11/21/22  8:58 AM

## 2022-11-21 NOTE — LACTATION NOTE
This note was copied from a baby's chart  CONSULT - LACTATION  Baby Girl (Lizbeth Kirkland 1 days female MRN: [id number]    [facility] Room / Bed: L&D 309(N)/L&D 309(N) Encounter: [id number]    Maternal Information     MOTHER:  Michelle Kirkland  Maternal Age: 23 y o    OB History: # 1 - Date: 20, Sex: Male, Weight: 3375 g (7 lb 7 1 oz), GA: 38w0d, Delivery: , Low Transverse, Apgar1: 9, Apgar5: 9, Living: Living, Birth Comments: None    # 2 - Date: 22, Sex: Female, Weight: 3880 g (8 lb 8 9 oz), GA: 39w4d, Delivery: , Low Transverse, Apgar1: 8, Apgar5: 9, Living: Living, Birth Comments: None   Previouse breast reduction surgery? No    Lactation history:   Has patient previously breast fed: Yes   How long had patient previously breast fed: 1 5 years   Previous breast feeding complications: None, Other (Comment) (History of oversupply (25 ounces at first pumping session in mornings) mostly pumping)     Past Surgical History:   Procedure Laterality Date   •  SECTION     • NC  DELIVERY ONLY N/A 2022    Procedure:  SECTION (); Surgeon: Maria Teresa Wilcox MD;  Location: [facility];  Service: Obstetrics        Birth information:  YOB: 2024   Time of birth: 5:30 PM   Sex: female   Delivery type: , Low Transverse   Birth Weight: 3880 g (8 lb 8 9 oz)   Percent of Weight Change: 0%     Gestational Age: 43w3d   [unfilled]    Assessment     Breast and nipple assessment: not assessed at this time    [place] Assessment: sleepy    Feeding assessment: no latch    Feeding recommendations:  breast feed on demand     Reviewed RSB  D/C booklet at bedside  Feeding not observed at this time, but verbal descriptions accurate for alignment  Reports feeding is easier in cradle hold on left breast  Encouraged trialing cross cradle hold on right breast due to dominant hand coordination c/o      Reviewed how to bring baby to the breast so that her lower lip and chin touch the breast with her nose just above the nipple to encourage a wider, more asymmetric latch  Met with mother  Provided mother with Ready, Set, Baby booklet which contained information on:  Hand expression with access to QR codes to review hand expression  Positioning and latch reviewed as well as showing images of other feeding positions  Discussed the properties of a good latch in any position  Feeding on cue and what that means for recognizing infant's hunger, s/s that baby is getting enough milk and some s/s that breastfeeding dyad may need further help  Skin to Skin contact an benefits to mom and baby  Avoidance of pacifiers for the first month discussed  Gave information on common concerns, what to expect the first few weeks after delivery, preparing for other caregivers, and how partners can help  Resources for support also provided  Encouraged parents to call for assistance, questions, and concerns about breastfeeding  Extension provided        Neel Hedrick RN 11/21/2022 1:47 PM

## 2022-11-22 VITALS
TEMPERATURE: 98.1 F | HEART RATE: 92 BPM | RESPIRATION RATE: 16 BRPM | BODY MASS INDEX: 47.8 KG/M2 | SYSTOLIC BLOOD PRESSURE: 131 MMHG | OXYGEN SATURATION: 100 % | WEIGHT: 280 LBS | DIASTOLIC BLOOD PRESSURE: 70 MMHG | HEIGHT: 64 IN

## 2022-11-22 RX ORDER — ACETAMINOPHEN 325 MG/1
650 TABLET ORAL EVERY 6 HOURS SCHEDULED
Qty: 12 TABLET | Refills: 0
Start: 2022-11-22 | End: 2022-11-24

## 2022-11-22 RX ORDER — IBUPROFEN 200 MG
600 TABLET ORAL EVERY 6 HOURS SCHEDULED
Start: 2022-11-22

## 2022-11-22 RX ORDER — OXYCODONE HYDROCHLORIDE 5 MG/1
5 TABLET ORAL EVERY 4 HOURS PRN
Qty: 5 TABLET | Refills: 0 | Status: CANCELLED | OUTPATIENT
Start: 2022-11-22 | End: 2022-12-02

## 2022-11-22 RX ORDER — DOCUSATE SODIUM 100 MG/1
100 CAPSULE, LIQUID FILLED ORAL 2 TIMES DAILY
Refills: 0 | Status: CANCELLED
Start: 2022-11-22

## 2022-11-22 RX ORDER — OXYCODONE HYDROCHLORIDE 10 MG/1
10 TABLET ORAL EVERY 4 HOURS PRN
Qty: 5 TABLET | Refills: 0 | Status: CANCELLED | OUTPATIENT
Start: 2022-11-22 | End: 2022-12-02

## 2022-11-22 RX ADMIN — IBUPROFEN 600 MG: 600 TABLET, FILM COATED ORAL at 05:41

## 2022-11-22 RX ADMIN — IBUPROFEN 600 MG: 600 TABLET, FILM COATED ORAL at 11:56

## 2022-11-22 RX ADMIN — IBUPROFEN 600 MG: 600 TABLET, FILM COATED ORAL at 18:09

## 2022-11-22 RX ADMIN — ACETAMINOPHEN 650 MG: 325 TABLET, FILM COATED ORAL at 11:56

## 2022-11-22 RX ADMIN — ACETAMINOPHEN 650 MG: 325 TABLET, FILM COATED ORAL at 05:41

## 2022-11-22 RX ADMIN — DOCUSATE SODIUM 100 MG: 100 CAPSULE, LIQUID FILLED ORAL at 08:40

## 2022-11-22 RX ADMIN — ENOXAPARIN SODIUM 40 MG: 40 INJECTION SUBCUTANEOUS at 08:40

## 2022-11-22 NOTE — UTILIZATION REVIEW
Initial Clinical Review    Admission: Date/Time/Statement:   Admission Orders (From admission, onward)     Ordered        22  Inpatient Admission  Once                      Orders Placed This Encounter   Procedures   • Inpatient Admission     Standing Status:   Standing     Number of Occurrences:   1     Order Specific Question:   Level of Care     Answer:   Med Surg [16]     Order Specific Question:   Estimated length of stay     Answer:   More than 2 Midnights     Order Specific Question:   Certification     Answer:   I certify that inpatient services are medically necessary for this patient for a duration of greater than two midnights  See H&P and MD Progress Notes for additional information about the patient's course of treatment  ED Arrival Information     Patient not seen in ED                     Chief Complaint   Patient presents with   • Scheduled Induction       Initial Presentation: 23 y o  female 39w2d admit Inpatient  for induction of labor for gestational hypertension  In triage initial SVE: 4, FHT category 1  PLAN IVF, continuous monitoring, marrufo balloon, IV Pit titration, AROM, epidural    C section for arrest of descent; failed TOLAC, Maternal request; Delivery by C section 2022 @ 1730 for  Viable female weighing 8lbs 8 9oz;  Apgar scores of 8 at one minute and 9    Date: 2022   Day 2:   4:14 AM OB MD Contraction Frequency (minutes): irregular, irritable- Marrufo balloon still in; plan start IV PIT   Contraction Quality: Mild  Tachysystole: No   Cervical Dilation: 1  Cervical Effacement: 30  @ 6:51 AM Oxytocin: 6 jeronimo-units/min  Contraction Frequency (minutes): irregular  Contraction Quality: Mild  Cervical Dilation: 3-4  Cervical Effacement: 50  Fetal Station: -3  @ 5:35 PM Oxytocin: 28 jeronimo-units/min; Recommend AROM- pt agreeable but wishes to wait until her mother arrives    Contraction Frequency (minutes): 2-5  Contraction Quality: Mild  Cervical Dilation: 5  Cervical Effacement: 60  @ 5:36 AROM for clear copious fluid  Epidural 2022 7:37 PM    DATE 2022 DAY #3  12:22 AM Oxytocin: 0 jeronimo-units/min (per Dr Anibal Peralta)  Contraction Frequency (minl Dilation: 6  Cervical Effacement: 70  Fetal Station: -2  @ 7:51 AM  Oxytocin: 30 jeronimo-units/min  Contraction Frequency (minutes): 2-4  Cervical Dilation: 7-8  Cervical Effacement: 100  Fetal Station: 0  @ 11:43 AM  9 5/100/0 - posterior thin cervix that did not reduce with bearing down  Oxytocin: 30 jeronimo-units/min  Contraction Frequency (minutes): 2 5-4 5; Fetal Station: 0  @ 3:27 PM Oxytocin: 30 jeronimo-units/min  Contraction Frequency (minutes): 2-4  Cervical Dilation: 10  Dilation Complete Date: 22  Dilation Complete Time: 1324  Cervical Effacement: 100  Repeat maternal temp 99 1, however, now fetal tachycardia in the 170s and maternal tachycardia 110 - will plan to start cefoxitin 2g q6h IV    @ 4:50PM OB Attending  Pt wanted time to consider options for route of delivery in light of dx of chorioamnionitis, long labor, SROM for nearly 24 hrs, +1 station after pushing for 90 minutes and estimated fetal weight of 9lbs  She requests repeat C/S at this time now    Pitocin has been off for nearly 2 hours      C section for arrest of descent; failed TOLAC, Maternal request; Delivery by C section @ 1730 for  Viable female weighing 8lbs 8 9oz;  Apgar scores of 8 at one minute and 9     Triage Vitals   Temperature Pulse Respirations Blood Pressure SpO2   22 1932   98 9 °F (37 2 °C) 85 18 145/78 99 %      Temp Source Heart Rate Source Patient Position - Orthostatic VS BP Location FiO2 (%)   22 --   Oral Monitor Lying Right arm       Pain Score       22       No Pain          Wt Readings from Last 1 Encounters:   22 127 kg (280 lb)     Additional Vital Signs:   22 1512 98 1 °F (36 7 °C) 92 16 131/70 -- -- -- -- Lying   11/22/22 1116 98 °F (36 7 °C) 76 18 142/80 -- -- -- -- --   11/22/22 0830 -- -- -- -- -- -- None (Room air) [facility] --   11/22/22 0700 98 1 °F (36 7 °C) 73 18 102/56 -- -- --       11/22/22 1512 98 1 °F (36 7 °C) 92 16 131/70 -- -- -- -- Lying   11/22/22 1116 98 °F (36 7 °C) 76 18 142/80 -- -- -- -- --   11/22/22 0830 -- -- -- -- -- -- None (Room air) [facility] --   11/22/22 0700 98 1 °F (36 7 °C) 73 18 102/56 -- -- --       11/20/22 0126 98 6 °F (37 °C) 100 20 145/76 -- -- -- -- --   11/20/22 0115 -- -- -- 139/94 -- -- -- -- --   11/20/22 0055 -- 97 -- 143/78 -- -- -- -- --   11/20/22 0040 -- 105 -- 142/81 -- -- -- -- --   11/20/22 0026 -- 98 -- 142/81 -- -- -- -- --   11/20/22 0000 98 6 °F (37 °C) 88 18 118/58 -- -- -- -- --     Weights (last 14 days) before discharge    Date/Time Weight Height   11/18/22 2121 127 kg (280 lb) 5' 4" (1 626 m)           Pertinent Labs/Diagnostic Test Results:   No orders to display         Results from last 7 days   Lab Units 11/21/22  0500 11/18/22  2207   WBC Thousand/uL 14 39* 12 52*   HEMOGLOBIN g/dL 9 6* 12 8   HEMATOCRIT % 28 6* 37 7   PLATELETS Thousands/uL 171 216   NEUTROS ABS Thousands/µL  --  9 10*         Results from last 7 days   Lab Units 11/21/22  1436 11/18/22  2207   SODIUM mmol/L 137 137   POTASSIUM mmol/L 3 8 3 5   CHLORIDE mmol/L 103 103   CO2 mmol/L 25 22   ANION GAP mmol/L 9 12   BUN mg/dL 13 7   CREATININE mg/dL 0 71 0 58*   EGFR ml/min/1 73sq m 119 129   CALCIUM mg/dL 8 1* 9 0     Results from last 7 days   Lab Units 11/18/22  2207   AST U/L 18   ALT U/L 17   ALK PHOS U/L 162*   TOTAL PROTEIN g/dL 7 8   ALBUMIN g/dL 3 1*   TOTAL BILIRUBIN mg/dL 1 20*         Results from last 7 days   Lab Units 11/21/22  1436 11/18/22  2207   GLUCOSE RANDOM mg/dL 115 79           Results from last 7 days   Lab Units 11/18/22  2142   CREATININE UR mg/dL 38 8   PROTEIN UR mg/dL 9   PROT/CREAT RATIO UR  0 23*         ED Treatment:   Medication Administration - No Administrations Displayed (No Start Event Found)     None        Past Medical History:   Diagnosis Date   • Gestational hypertension     38 weeks, August 2020     Present on Admission:  • Gestational hypertension      Admitting Diagnosis: Encounter for full-term uncomplicated delivery [L35]  Age/Sex: 23 y o  female  Admission Orders:  Continuous external uterine contraction & fetal HR monitoring    Scheduled Medications:  acetaminophen, 650 mg, Oral, Q6H [facility] & detention  docusate sodium, 100 mg, Oral, BID  enoxaparin, 40 mg, Subcutaneous, Q12H  ibuprofen, 600 mg, Oral, Q6H [facility] & [facility] HOME  oxytocin, 62 5 jeronimo-units/min, Intravenous, Once  senna, 8 8 mg, Oral, HS    Continuous IV Infusions:    oxytocin (PITOCIN) 30 Units in lactated ringers 500 mL infusion 11/19 0430 & DC [id number]  Rate: 1-30 mL/hr Dose: 1-30 jeronimo-units/min  Freq: Titrated Route: IV    lactated ringers infusion 11/19 0430 & DC 11/21 1612  Rate: 125 mL/hr Dose: 125 mL/hr  Freq: Continuous Route: IV    PRN Meds:  benzocaine-menthol-lanolin-aloe, 1 application, Topical, K6Y PRN  calcium carbonate, 1,000 mg, Oral, Daily PRN  diphenhydrAMINE, 25 mg, Intravenous, Q6H PRN  fentaNYL, 50 mcg, Intravenous, Q5 Min PRN  hydrocortisone, 1 application, Topical, Daily PRN  metoclopramide, 10 mg, Intravenous, Q6H PRN  ondansetron, 4 mg, Intravenous, Q8H PRN  oxyCODONE, 10 mg, Oral, Q4H PRN  oxyCODONE, 5 mg, Oral, Q4H PRN  simethicone, 80 mg, Oral, 4x Daily PRN  witch hazel-glycerin, 1 pad, Topical, Q4H PRN    IP CONSULT TO ANESTHESIOLOGY    Network Utilization Review Department  ATTENTION: Please call with any questions or concerns to [phone] and carefully listen to the prompts so that you are directed to the right person  All voicemails are confidential   Torrie Mcnulty all requests for admission clinical reviews, approved or denied determinations and any other requests to dedicated fax number below belonging to the campus where the patient is receiving treatment   List of dedicated fax numbers for the Facilities:  [address] DENIALS (Administrative/Medical Necessity) [phone]   [address] (Maternity/NICU/Pediatrics) [phone]   [address] [phone]   Lucia [address] [phone]   [address] [place] [address] [facility] 28 [phone]   [address] [facility] Miller [facility] 134 815 [facility] [phone]

## 2022-11-22 NOTE — PLAN OF CARE
Problem: PAIN - ADULT  Goal: Verbalizes/displays adequate comfort level or baseline comfort level  Description: Interventions:  - Encourage patient to monitor pain and request assistance  - Assess pain using appropriate pain scale  - Administer analgesics based on type and severity of pain and evaluate response  - Implement non-pharmacological measures as appropriate and evaluate response  - Consider cultural and social influences on pain and pain management  - Notify physician/advanced practitioner if interventions unsuccessful or patient reports new pain  Outcome: Progressing     Problem: INFECTION - ADULT  Goal: Absence or prevention of progression during hospitalization  Description: INTERVENTIONS:  - Assess and monitor for signs and symptoms of infection  - Monitor lab/diagnostic results  - Monitor all insertion sites, i e  indwelling lines, tubes, and drains  - Monitor endotracheal if appropriate and nasal secretions for changes in amount and color  - [place] appropriate cooling/warming therapies per order  - Administer medications as ordered  - Instruct and encourage patient and family to use good hand hygiene technique  - Identify and instruct in appropriate isolation precautions for identified infection/condition  Outcome: Progressing  Goal: Absence of fever/infection during neutropenic period  Description: INTERVENTIONS:  - Monitor WBC    Outcome: Progressing     Problem: SAFETY ADULT  Goal: Patient will remain free of falls  Description: INTERVENTIONS:  - Educate patient/family on patient safety including physical limitations  - Instruct patient to call for assistance with activity   - Consult OT/PT to assist with strengthening/mobility   - Keep Call bell within reach  - Keep bed low and locked with side rails adjusted as appropriate  - Keep care items and personal belongings within reach  - Initiate and maintain comfort rounds  - Make Fall Risk Sign visible to staff  - Offer Toileting every  Hours, in advance of need  - Initiate/Maintain alarm  - Obtain necessary fall risk management equipment:   - Apply yellow socks and bracelet for high fall risk patients  - Consider moving patient to room near nurses station  Outcome: Progressing  Goal: Maintain or return to baseline ADL function  Description: INTERVENTIONS:  -  Assess patient's ability to carry out ADLs; assess patient's baseline for ADL function and identify physical deficits which impact ability to perform ADLs (bathing, care of mouth/teeth, toileting, grooming, dressing, etc )  - Assess/evaluate cause of self-care deficits   - Assess range of motion  - Assess patient's mobility; develop plan if impaired  - Assess patient's need for assistive devices and provide as appropriate  - Encourage maximum independence but intervene and supervise when necessary  - Involve family in performance of ADLs  - Assess for home care needs following discharge   - Consider OT consult to assist with ADL evaluation and planning for discharge  - Provide patient education as appropriate  Outcome: Progressing  Goal: Maintains/Returns to pre admission functional level  Description: INTERVENTIONS:  - Perform BMAT or MOVE assessment daily    - Set and communicate daily mobility goal to care team and patient/family/caregiver  - Collaborate with rehabilitation services on mobility goals if consulted  - Perform Range of Motion  times a day  - Reposition patient every  hours    - Dangle patient  times a day  - Stand patient  times a day  - Ambulate patient  times a day  - Out of bed to chair  times a day   - Out of bed for meals  times a day  - Out of bed for toileting  - Record patient progress and toleration of activity level   Outcome: Progressing     Problem: Knowledge Deficit  Goal: Patient/family/caregiver demonstrates understanding of disease process, treatment plan, medications, and discharge instructions  Description: Complete learning assessment and assess knowledge base   Interventions:  - Provide teaching at level of understanding  - Provide teaching via preferred learning methods  Outcome: Progressing     Problem: DISCHARGE PLANNING  Goal: Discharge to home or other facility with appropriate resources  Description: INTERVENTIONS:  - Identify barriers to discharge w/patient and caregiver  - Arrange for needed discharge resources and transportation as appropriate  - Identify discharge learning needs (meds, wound care, etc )  - Arrange for interpretive services to assist at discharge as needed  - Refer to Case Management Department for coordinating discharge planning if the patient needs post-hospital services based on physician/advanced practitioner order or complex needs related to functional status, cognitive ability, or social support system  Outcome: Progressing

## 2022-11-22 NOTE — LACTATION NOTE
This note was copied from a baby's chart  CONSULT - LACTATION  Baby Girl (Lizbeth Kirkland 2 days female MRN: [id number]    [address] [facility] ULTRASOUND Room / Bed: L&D 309(N)/L&D 309(N) Encounter: [id number]    Maternal Information     MOTHER:  Michelle Kirkland  Maternal Age: 23 y o    OB History: # 1 - Date: 20, Sex: Male, Weight: 3375 g (7 lb 7 1 oz), GA: 38w0d, Delivery: , Low Transverse, Apgar1: 9, Apgar5: 9, Living: Living, Birth Comments: None    # 2 - Date: 22, Sex: Female, Weight: 3880 g (8 lb 8 9 oz), GA: 39w4d, Delivery: , Low Transverse, Apgar1: 8, Apgar5: 9, Living: Living, Birth Comments: None   Previouse breast reduction surgery? No    Lactation history:   Has patient previously breast fed: Yes   How long had patient previously breast fed: 1 5 years   Previous breast feeding complications: None, Other (Comment) (History of oversupply (25 ounces at first pumping session in mornings) mostly pumping)     Past Surgical History:   Procedure Laterality Date   •  SECTION     • NE  DELIVERY ONLY N/A 2022    Procedure:  SECTION ();   Surgeon: Kalina Rudd MD;  Location: [facility];  Service: Obstetrics        Birth information:  YOB: 2024   Time of birth: 5:30 PM   Sex: female   Delivery type: , Low Transverse   Birth Weight: 3880 g (8 lb 8 9 oz)   Percent of Weight Change: -3%     Gestational Age: 43w3d     Feeding recommendations:  breast feed on demand        22 1030   Lactation Consultation   Reason for Consult 10 minutes   Breasts/Nipples   Left Breast Filling   Right Breast Filling   Breastfeeding Status Yes   Breastfeeding Progress Breastfeeding well  (per Michelle)   Patient Follow-Up   Lactation Consult Status 2   Follow-Up Type Inpatient;Call as needed   Other OB Lactation Documentation    Additional Problem Noted Michelle reports that she tried to breastfeed in cross cradle hold on the right breast and found that it worked for a comfortable latch for feeding Rachel  Reviewed needs for education after Michelle looked as D/C booklet  Met with mother to go over discharge breastfeeding booklet including the feeding log  Emphasized 8 or more (12) feedings in a 24 hour period, what to expect for the number of diapers per day of life and the progression of properties of the  stooling pattern  Reviewed breastfeeding and your lifestyle, storage and preparation of breast milk, how to keep you breast pump clean, the employed breastfeeding mother and paced bottle feeding handouts  Booklet included Breastfeeding Resources for after discharge including access to the number for the [address]  Discussed s/s engorgement, blocked milk ducts, and mastitis  Discussed how to remedy at home and when to contact physician  Encouraged parents to call for assistance, questions, and concerns about breastfeeding  Extension provided      Padmini Keen RN 2022 12:40 PM

## 2022-11-22 NOTE — NURSING NOTE
Discharge teaching done  Save your life magnet and all handouts given  Questions encouraged and answered  Discharge teaching done  Save your life magnet and all handouts given  Questions encouraged and answered

## 2022-11-22 NOTE — PLAN OF CARE
Problem: PAIN - ADULT  Goal: Verbalizes/displays adequate comfort level or baseline comfort level  Description: Interventions:  - Encourage patient to monitor pain and request assistance  - Assess pain using appropriate pain scale  - Administer analgesics based on type and severity of pain and evaluate response  - Implement non-pharmacological measures as appropriate and evaluate response  - Consider cultural and social influences on pain and pain management  - Notify physician/advanced practitioner if interventions unsuccessful or patient reports new pain  Outcome: Progressing     Problem: INFECTION - ADULT  Goal: Absence or prevention of progression during hospitalization  Description: INTERVENTIONS:  - Assess and monitor for signs and symptoms of infection  - Monitor lab/diagnostic results  - Monitor all insertion sites, i e  indwelling lines, tubes, and drains  - Monitor endotracheal if appropriate and nasal secretions for changes in amount and color  - [place] appropriate cooling/warming therapies per order  - Administer medications as ordered  - Instruct and encourage patient and family to use good hand hygiene technique  - Identify and instruct in appropriate isolation precautions for identified infection/condition  Outcome: Progressing  Goal: Absence of fever/infection during neutropenic period  Description: INTERVENTIONS:  - Monitor WBC    Outcome: Progressing     Problem: SAFETY ADULT  Goal: Patient will remain free of falls  Description: INTERVENTIONS:  - Educate patient/family on patient safety including physical limitations  - Instruct patient to call for assistance with activity   - Consult OT/PT to assist with strengthening/mobility   - Keep Call bell within reach  - Keep bed low and locked with side rails adjusted as appropriate  - Keep care items and personal belongings within reach  - Initiate and maintain comfort rounds  - Make Fall Risk Sign visible to staff  - Apply yellow socks and bracelet for high fall risk patients  - Consider moving patient to room near nurses station  Outcome: Progressing  Goal: Maintain or return to baseline ADL function  Description: INTERVENTIONS:  -  Assess patient's ability to carry out ADLs; assess patient's baseline for ADL function and identify physical deficits which impact ability to perform ADLs (bathing, care of mouth/teeth, toileting, grooming, dressing, etc )  - Assess/evaluate cause of self-care deficits   - Assess range of motion  - Assess patient's mobility; develop plan if impaired  - Assess patient's need for assistive devices and provide as appropriate  - Encourage maximum independence but intervene and supervise when necessary  - Involve family in performance of ADLs  - Assess for home care needs following discharge   - Consider OT consult to assist with ADL evaluation and planning for discharge  - Provide patient education as appropriate  Outcome: Progressing  Goal: Maintains/Returns to pre admission functional level  Description: INTERVENTIONS:  - Perform BMAT or MOVE assessment daily    - Set and communicate daily mobility goal to care team and patient/family/caregiver  - Collaborate with rehabilitation services on mobility goals if consulted  - Out of bed for toileting  - Record patient progress and toleration of activity level   Outcome: Progressing     Problem: Knowledge Deficit  Goal: Patient/family/caregiver demonstrates understanding of disease process, treatment plan, medications, and discharge instructions  Description: Complete learning assessment and assess knowledge base    Interventions:  - Provide teaching at level of understanding  - Provide teaching via preferred learning methods  Outcome: Progressing     Problem: DISCHARGE PLANNING  Goal: Discharge to home or other facility with appropriate resources  Description: INTERVENTIONS:  - Identify barriers to discharge w/patient and caregiver  - Arrange for needed discharge resources and transportation as appropriate  - Identify discharge learning needs (meds, wound care, etc )  - Arrange for interpretive services to assist at discharge as needed  - Refer to Case Management Department for coordinating discharge planning if the patient needs post-hospital services based on physician/advanced practitioner order or complex needs related to functional status, cognitive ability, or social support system  Outcome: Progressing

## 2022-11-22 NOTE — PROGRESS NOTES
Progress Note - OB/GYN  Kesha Peterson 23 y o  female MRN: [id number]  Unit/Bed#: L&D 309-01 Encounter: [id number]    Assessment and Plan     Kesha Peterson is a patient of: [facility]  She is PPD# 2 s/p  repeat  section, low transverse incision  Recovering well and is stable       Chorioamnionitis  Assessment & Plan  Tmax 100 9 on  @ 13:50  Afebrile since  Clinda/Gent x1 dose postpartum      Low urine output  Assessment & Plan  Resolved,  6cc/kg/hr, marrufo removed    Gestational hypertension  Assessment & Plan  Systolic (65BGC), TNS:301 , Min:129 , CRISTA   Diastolic (28LXS), KZA:07, Min:71, Max:77  PCr 0 23  Denies HA, changes in vision, SOB, RUQ pain      * S/P repeat low transverse   Assessment & Plan  , Hgb 12 8 --> post op Hgb 9 6, venofer given  Marrufo removed, passed VT  Pain: Tylenol and toradol scheduled, nevaeh 5/10 PRN    FEN: Tolerating regular diet  DVT ppx: SCDs and Lovenox 40mg BID (BMI 48)  Denies passing flatus  Incision C/D/I, mepilex dressing in place          Disposition    - Anticipate discharge home on PPD# 2-4      Subjective/Objective     Chief Complaint: Postpartum State     Subjective:    Kesha Peterson is PPD/POD#2 s/p  repeat  section, low transverse incision  She has no current complaints  Pain is well controlled  Patient is currently voiding  She is ambulating  Patient is currently passing flatus and has had no bowel movement  She is tolerating PO, and denies nausea or vomitting  Patient denies fever, chills, chest pain, shortness of breath, or calf tenderness  Lochia is minimal  She is  Breastfeeding  She is recovering well and is stable         Vitals:   /76 (BP Location: Left arm)   Pulse 80   Temp 98 3 °F (36 8 °C) (Oral)   Resp 16   Ht 5' 4" (1 626 m)   Wt 127 kg (280 lb)   SpO2 100%   Breastfeeding Yes   BMI 48 06 kg/m²       Intake/Output Summary (Last 24 hours) at 2022 6108  Last data filed at 11/22/2022 0430  Gross per 24 hour   Intake 6606 75 ml   Output 2750 ml   Net 3856 75 ml       Invasive Devices     Peripheral Intravenous Line  Duration           Peripheral IV 11/20/22 Left;Medial Wrist 1 day                Physical Exam:   GEN: Eugenia Pruett appears well, alert and oriented x 3, pleasant and cooperative   CARDIO: RRR, no murmurs or rubs  RESP:  CTAB, no wheezes or rales  ABDOMEN: soft, no tenderness, no distention, fundus @ U-2, Incision C/D/I  EXTREMITIES: SCDs on, non tender, no erythema, b/l Neva's sign negative      Labs:     Hemoglobin   Date Value Ref Range Status   11/21/2022 9 6 (L) 11 5 - 15 4 g/dL Final   11/18/2022 12 8 11 5 - 15 4 g/dL Final     WBC   Date Value Ref Range Status   11/21/2022 14 39 (H) 4 31 - 10 16 Thousand/uL Final   11/18/2022 12 52 (H) 4 31 - 10 16 Thousand/uL Final     Platelets   Date Value Ref Range Status   11/21/2022 171 149 - 390 Thousands/uL Final   11/18/2022 216 149 - 390 Thousands/uL Final     Creatinine   Date Value Ref Range Status   11/21/2022 0 71 0 60 - 1 30 mg/dL Final     Comment:     Standardized to IDMS reference method   11/18/2022 0 58 (L) 0 60 - 1 30 mg/dL Final     Comment:     Standardized to IDMS reference method     AST   Date Value Ref Range Status   11/18/2022 18 5 - 45 U/L Final     Comment:     Specimen collection should occur prior to Sulfasalazine administration due to the potential for falsely depressed results  11/01/2022 16 5 - 45 U/L Final     Comment:     Specimen collection should occur prior to Sulfasalazine administration due to the potential for falsely depressed results  ALT   Date Value Ref Range Status   11/18/2022 17 12 - 78 U/L Final     Comment:     Specimen collection should occur prior to Sulfasalazine administration due to the potential for falsely depressed results      11/01/2022 17 12 - 78 U/L Final     Comment:     Specimen collection should occur prior to Sulfasalazine administration due to the potential for falsely depressed results             Cyn Andrew MD  11/22/2022  6:21 AM

## 2022-11-22 NOTE — DISCHARGE SUMMARY
Discharge Summary - OB/GYN   Mojgan Liu 23 y o  female MRN: [id number]  Unit/Bed#: L&D 309-01 Encounter: [id number]      Admission Date: 2022     Discharge Date: 22    Admitting Diagnosis:   1  Pregnancy at 39w4d  2  Gestational hypertension  3  Previous  Delivery  4  Desires TOLAC    Discharge Diagnosis:   Same, delivered  Intrapartum intra-amniotic infection  Failure to descend  Maternal request for repeat C/S    Procedures: repeat  section, low transverse incision    Delivering Attending: Samson Grant DO  Discharge Attending: Dr Talha Ugarte Course:     Mojgan Liu is a 23 y o  [id number] at 39w4d wks who [place] [facility]  Initial SVE was , and patient received marrufo balloon and was later switched to pitocin  Patient dilated to complete; however, with pushing, patient was unable to make progress, and had arrest of descent  Patient also received diagnosis of chorioamnionitis based on elevated temperature of 100 9  The patient opted for repeat  section, and received postpartum Clinda/Gent  She delivered a viable female  on 22 at 1730  Weight 8lbs 8oz via repeat  section, low transverse incision  Apgars were 8 (1 min) and 9 (5 min)   was transferred to  nursery  Patient tolerated the procedure well and was transferred to recovery in stable condition  Her post-operative course was uncomplicated  Preoperative hemaglobin was 12 8, postoperative was 9 6 and patient received venofer  Her postoperative pain was well controlled with oral analgesics  On day of discharge, she was ambulating and able to reasonably perform all ADLs  She was voiding and had appropriate bowel function  Pain was well controlled  She was discharged home on post-operative day #2 without complications   Patient was instructed to follow up with her OB as an outpatient and was given appropriate warnings to call provider if she develops signs of infection or uncontrolled pain  Complications: none apparent    Condition at discharge: good     Discharge instructions/Information to patient and family:   See after visit summary for information provided to patient and family  Provisions for Follow-Up Care:  See after visit summary for information related to follow-up care and any pertinent home health orders  Disposition: Home    Planned Readmission: No    Discharge Medications: For a complete list of the patient's medications, please refer to her med rec  Dre Arellano MD  PGY-1    Attending/Teaching Physician Statement  I have participated in the care of this patient during this hospitalization and agree with the discharge summary      Hadley Bence, DO  11/22/22  5:46 PM

## (undated) DEVICE — DRESSING MEPILEX BORDER 4 X 12 IN

## (undated) DEVICE — PILLOW FETAL SILICONE BALLOON DVC

## (undated) DEVICE — ABG MICROSTICKS SAFETY

## (undated) DEVICE — GLOVE INDICATOR PI UNDERGLOVE SZ 7.5 BLUE

## (undated) DEVICE — CHLORAPREP HI-LITE 26ML ORANGE

## (undated) DEVICE — SCD SEQUENTIAL COMPRESSION COMFORT SLEEVE MEDIUM KNEE LENGTH: Brand: KENDALL SCD

## (undated) DEVICE — SUT MONOCRYL 4-0 PS-2 27 IN Y426H

## (undated) DEVICE — SUT VICRYL 0 CT 36 IN J958H

## (undated) DEVICE — SUT PLAIN 3-0 CT-1 27 IN 842H

## (undated) DEVICE — GLOVE SRG BIOGEL ECLIPSE 7

## (undated) DEVICE — PACK C-SECTION PBDS